# Patient Record
Sex: FEMALE | Race: WHITE | NOT HISPANIC OR LATINO | Employment: OTHER | URBAN - METROPOLITAN AREA
[De-identification: names, ages, dates, MRNs, and addresses within clinical notes are randomized per-mention and may not be internally consistent; named-entity substitution may affect disease eponyms.]

---

## 2018-04-10 ENCOUNTER — ANESTHESIA EVENT (OUTPATIENT)
Dept: PERIOP | Facility: HOSPITAL | Age: 73
End: 2018-04-10

## 2018-04-10 ENCOUNTER — OFFICE VISIT (OUTPATIENT)
Dept: SURGERY | Facility: CLINIC | Age: 73
End: 2018-04-10

## 2018-04-10 VITALS
DIASTOLIC BLOOD PRESSURE: 92 MMHG | BODY MASS INDEX: 33.82 KG/M2 | HEIGHT: 65 IN | SYSTOLIC BLOOD PRESSURE: 152 MMHG | WEIGHT: 203 LBS

## 2018-04-10 DIAGNOSIS — K80.20 CALCULUS OF GALLBLADDER WITHOUT CHOLECYSTITIS WITHOUT OBSTRUCTION: Primary | ICD-10-CM

## 2018-04-10 PROCEDURE — 99203 OFFICE O/P NEW LOW 30 MIN: CPT | Performed by: SURGERY

## 2018-04-10 RX ORDER — LEVOTHYROXINE SODIUM 112 UG/1
112 TABLET ORAL DAILY
COMMUNITY
Start: 2018-02-28 | End: 2021-01-16 | Stop reason: SDUPTHER

## 2018-04-10 NOTE — PROGRESS NOTES
PATIENT INFORMATION  Mer Ramos  GB CONS, MRI DONE AT [facility], ABD PAIN, BELCHING AND ACID REFLUX WITH CERTAIN FOODS, CONSTIPATION, has seen Dr. Mcfarland before     - 1945    CHIEF COMPLAINT  Chief Complaint   Patient presents with   • Cholelithiasis       HISTORY OF PRESENT ILLNESS  HPI she complains of intermittent epigastric right upper quadrant going around to her right back pain she also complains of some left upper quadrant pain as well.  She does have some nausea with S primarily.  She does complain of upper abdominal bloating.  She denies any fevers or chills or jaundice type symptoms.  She does take over-the-counter Zantac for reflux symptoms.  She had an MRI of her abdomen to follow-up on a left adrenal adenoma.  Gallstones were found at that time.        REVIEW OF SYSTEMS  Review of Systems back pain otherwise negative      ACTIVE PROBLEMS  Patient Active Problem List    Diagnosis   • Benign essential hypertension [I10]   • Chest pain [R07.9]     Overview Note:     Overview:   #1 normal nuclear stress test 2014 with no ischemia and LVEF 70%     • Hyperlipidemia [E78.5]   • Statin intolerance [Z78.9]         PAST MEDICAL HISTORY  Past Medical History:   Diagnosis Date   • Arthritis    • Hypertension    • Thyroid disease          SURGICAL HISTORY  Past Surgical History:   Procedure Laterality Date   • BREAST SURGERY     • COLONOSCOPY     • COLONOSCOPY N/A 2016    Procedure: COLONOSCOPY with polypectomy;  Surgeon: Angelia Catherine MD;  Location: [facility];  Service:    • HYSTERECTOMY     • THYROID SURGERY     • TUBAL ABDOMINAL LIGATION           FAMILY HISTORY  Family History   Problem Relation Age of Onset   • Breast cancer Maternal Grandmother          SOCIAL HISTORY  Social History     Occupational History   • Not on file.     Social History Main Topics   • Smoking status: Never Smoker   • Smokeless tobacco: Never Used   • Alcohol use No   • Drug use: No   • Sexual activity: Not on  "file         CURRENT MEDICATIONS    Current Outpatient Prescriptions:   •  ALPRAZolam (XANAX) 0.5 MG tablet, , Disp: , Rfl:   •  levothyroxine (SYNTHROID, LEVOTHROID) 112 MCG tablet, , Disp: , Rfl:   •  losartan (COZAAR) 50 MG tablet, , Disp: , Rfl:     ALLERGIES  Bactrim [sulfamethoxazole-trimethoprim]; Ceclor [cefaclor]; Ciprocinonide [fluocinolone]; Ciprofloxacin; Codeine; Levaquin [levofloxacin]; Penicillins; Sulfa antibiotics; and Tegretol [carbamazepine]    VITALS  Vitals:    04/10/18 0933   BP: 152/92   Weight: 92.1 kg (203 lb)   Height: 165.1 cm (65\")       LAST RESULTS   Admission on 03/02/2018, Discharged on 03/02/2018   Component Date Value Ref Range Status   • Color 03/02/2018 Yellow  Yellow, Straw, Dark Yellow, Chantale Final   • Clarity, UA 03/02/2018 Cloudy* Clear Final   • Glucose, UA 03/02/2018 Negative  Negative, 1000 mg/dL (3+) mg/dL Final   • Bilirubin 03/02/2018 Negative  Negative Final   • Ketones, UA 03/02/2018 Negative  Negative Final   • Specific Gravity  03/02/2018 1.005  1.005 - 1.030 Final   • Blood, UA 03/02/2018 50 Krystian/ul* Negative Final   • pH, Urine 03/02/2018 6.0  5.0 - 8.0 Final   • Protein, POC 03/02/2018 Negative  Negative mg/dL Final   • Urobilinogen, UA 03/02/2018 Normal  Normal Final   • Leukocytes 03/02/2018 Trace* Negative Final   • Nitrite, UA 03/02/2018 Negative  Negative Final   • Urine Culture 03/04/2018 Final report   Final   • Result 1 03/04/2018 Comment   Final     No results found.    PHYSICAL EXAM  Physical Exam's alert white female in no active distress.  She has no clinical jaundice.  She is oriented ×3.  Her trachea is in the midline.  Her gait is normal.  Her heart shows a regular rate and rhythm.  Her lungs are clear and equal.  Abdomen she has subjective epigastric and right upper quadrant tenderness.  There was no left upper quadrant tenderness.  She has a lower midline scar.  There was no abdominal mass.  MRI of her abdomen showed cholelithiasis and a 1.8 cm " left adrenal adenoma.    ASSESSMENT  Cholelithiasis      PLAN  There is benefits options were discussed with her in detail.  We will proceed with a laparoscopic cholecystectomy this is been arranged for [facility] for April 20 on an outpatient basis.

## 2018-04-10 NOTE — H&P
PATIENT INFORMATION  Mer Ramos  GB CONS, MRI DONE AT [facility], ABD PAIN, BELCHING AND ACID REFLUX WITH CERTAIN FOODS, CONSTIPATION, has seen Dr. Mcfarland before     - 1945    CHIEF COMPLAINT  Chief Complaint   Patient presents with   • Cholelithiasis       HISTORY OF PRESENT ILLNESS  HPI she complains of intermittent epigastric right upper quadrant going around to her right back pain she also complains of some left upper quadrant pain as well.  She does have some nausea with S primarily.  She does complain of upper abdominal bloating.  She denies any fevers or chills or jaundice type symptoms.  She does take over-the-counter Zantac for reflux symptoms.  She had an MRI of her abdomen to follow-up on a left adrenal adenoma.  Gallstones were found at that time.        REVIEW OF SYSTEMS  Review of Systems back pain otherwise negative      ACTIVE PROBLEMS  Patient Active Problem List    Diagnosis   • Benign essential hypertension [I10]   • Chest pain [R07.9]     Overview Note:     Overview:   #1 normal nuclear stress test 2014 with no ischemia and LVEF 70%     • Hyperlipidemia [E78.5]   • Statin intolerance [Z78.9]         PAST MEDICAL HISTORY  Past Medical History:   Diagnosis Date   • Arthritis    • Hypertension    • Thyroid disease          SURGICAL HISTORY  Past Surgical History:   Procedure Laterality Date   • BREAST SURGERY     • COLONOSCOPY     • COLONOSCOPY N/A 2016    Procedure: COLONOSCOPY with polypectomy;  Surgeon: Angelia Catherine MD;  Location: [facility];  Service:    • HYSTERECTOMY     • THYROID SURGERY     • TUBAL ABDOMINAL LIGATION           FAMILY HISTORY  Family History   Problem Relation Age of Onset   • Breast cancer Maternal Grandmother          SOCIAL HISTORY  Social History     Occupational History   • Not on file.     Social History Main Topics   • Smoking status: Never Smoker   • Smokeless tobacco: Never Used   • Alcohol use No   • Drug use: No   • Sexual activity: Not on  "file         CURRENT MEDICATIONS    Current Outpatient Prescriptions:   •  ALPRAZolam (XANAX) 0.5 MG tablet, , Disp: , Rfl:   •  levothyroxine (SYNTHROID, LEVOTHROID) 112 MCG tablet, , Disp: , Rfl:   •  losartan (COZAAR) 50 MG tablet, , Disp: , Rfl:     ALLERGIES  Bactrim [sulfamethoxazole-trimethoprim]; Ceclor [cefaclor]; Ciprocinonide [fluocinolone]; Ciprofloxacin; Codeine; Levaquin [levofloxacin]; Penicillins; Sulfa antibiotics; and Tegretol [carbamazepine]    VITALS  Vitals:    04/10/18 0933   BP: 152/92   Weight: 92.1 kg (203 lb)   Height: 165.1 cm (65\")       LAST RESULTS   Admission on 03/02/2018, Discharged on 03/02/2018   Component Date Value Ref Range Status   • Color 03/02/2018 Yellow  Yellow, Straw, Dark Yellow, Chantale Final   • Clarity, UA 03/02/2018 Cloudy* Clear Final   • Glucose, UA 03/02/2018 Negative  Negative, 1000 mg/dL (3+) mg/dL Final   • Bilirubin 03/02/2018 Negative  Negative Final   • Ketones, UA 03/02/2018 Negative  Negative Final   • Specific Gravity  03/02/2018 1.005  1.005 - 1.030 Final   • Blood, UA 03/02/2018 50 Krystian/ul* Negative Final   • pH, Urine 03/02/2018 6.0  5.0 - 8.0 Final   • Protein, POC 03/02/2018 Negative  Negative mg/dL Final   • Urobilinogen, UA 03/02/2018 Normal  Normal Final   • Leukocytes 03/02/2018 Trace* Negative Final   • Nitrite, UA 03/02/2018 Negative  Negative Final   • Urine Culture 03/04/2018 Final report   Final   • Result 1 03/04/2018 Comment   Final     No results found.    PHYSICAL EXAM  Physical Exam's alert white female in no active distress.  She has no clinical jaundice.  She is oriented ×3.  Her trachea is in the midline.  Her gait is normal.  Her heart shows a regular rate and rhythm.  Her lungs are clear and equal.  Abdomen she has subjective epigastric and right upper quadrant tenderness.  There was no left upper quadrant tenderness.  She has a lower midline scar.  There was no abdominal mass.  MRI of her abdomen showed cholelithiasis and a 1.8 cm " left adrenal adenoma.    ASSESSMENT  Cholelithiasis      PLAN  There is benefits options were discussed with her in detail.  We will proceed with a laparoscopic cholecystectomy this is been arranged for [facility] for April 20 on an outpatient basis.

## 2018-04-13 ENCOUNTER — APPOINTMENT (OUTPATIENT)
Dept: PREADMISSION TESTING | Facility: HOSPITAL | Age: 73
End: 2018-04-13

## 2018-04-13 VITALS
DIASTOLIC BLOOD PRESSURE: 86 MMHG | RESPIRATION RATE: 16 BRPM | BODY MASS INDEX: 33.57 KG/M2 | HEIGHT: 65 IN | WEIGHT: 201.5 LBS | OXYGEN SATURATION: 96 % | SYSTOLIC BLOOD PRESSURE: 142 MMHG | HEART RATE: 94 BPM

## 2018-04-13 DIAGNOSIS — K80.20 CALCULUS OF GALLBLADDER WITHOUT CHOLECYSTITIS WITHOUT OBSTRUCTION: ICD-10-CM

## 2018-04-13 LAB
ALBUMIN SERPL-MCNC: 4.1 G/DL (ref 3.5–5.2)
ALBUMIN/GLOB SERPL: 1.2 G/DL
ALP SERPL-CCNC: 37 U/L (ref 40–129)
ALT SERPL W P-5'-P-CCNC: 12 U/L (ref 5–33)
ANION GAP SERPL CALCULATED.3IONS-SCNC: 10.3 MMOL/L
AST SERPL-CCNC: 17 U/L (ref 5–32)
BILIRUB SERPL-MCNC: 0.3 MG/DL (ref 0.2–1.2)
BUN BLD-MCNC: 11 MG/DL (ref 8–23)
BUN/CREAT SERPL: 13.1 (ref 7–25)
CALCIUM SPEC-SCNC: 9.6 MG/DL (ref 8.8–10.5)
CHLORIDE SERPL-SCNC: 100 MMOL/L (ref 98–107)
CO2 SERPL-SCNC: 30.7 MMOL/L (ref 22–29)
CREAT BLD-MCNC: 0.84 MG/DL (ref 0.57–1)
DEPRECATED RDW RBC AUTO: 41.2 FL (ref 37–54)
ERYTHROCYTE [DISTWIDTH] IN BLOOD BY AUTOMATED COUNT: 13.2 % (ref 11.5–14.5)
GFR SERPL CREATININE-BSD FRML MDRD: 67 ML/MIN/1.73
GLOBULIN UR ELPH-MCNC: 3.5 GM/DL
GLUCOSE BLD-MCNC: 91 MG/DL (ref 65–99)
HCT VFR BLD AUTO: 36.5 % (ref 37–47)
HGB BLD-MCNC: 12 G/DL (ref 12–16)
MCH RBC QN AUTO: 28.4 PG (ref 27–31)
MCHC RBC AUTO-ENTMCNC: 32.9 G/DL (ref 31–37)
MCV RBC AUTO: 86.3 FL (ref 81–99)
PLATELET # BLD AUTO: 189 10*3/MM3 (ref 140–500)
PMV BLD AUTO: 11.1 FL (ref 7.4–10.4)
POTASSIUM BLD-SCNC: 4 MMOL/L (ref 3.5–5.2)
PROT SERPL-MCNC: 7.6 G/DL (ref 6–8.5)
RBC # BLD AUTO: 4.23 10*6/MM3 (ref 4.2–5.4)
SODIUM BLD-SCNC: 141 MMOL/L (ref 136–145)
WBC NRBC COR # BLD: 5.48 10*3/MM3 (ref 4.8–10.8)

## 2018-04-13 PROCEDURE — 36415 COLL VENOUS BLD VENIPUNCTURE: CPT

## 2018-04-13 PROCEDURE — 85027 COMPLETE CBC AUTOMATED: CPT | Performed by: SURGERY

## 2018-04-13 PROCEDURE — 80053 COMPREHEN METABOLIC PANEL: CPT | Performed by: SURGERY

## 2018-04-13 RX ORDER — FAMOTIDINE 10 MG
10 TABLET ORAL 2 TIMES DAILY PRN
COMMUNITY
End: 2019-04-02

## 2018-04-13 RX ORDER — ACETAMINOPHEN 500 MG
1000 TABLET ORAL EVERY 6 HOURS PRN
COMMUNITY

## 2018-04-13 NOTE — PAT
Pt here for PAT visit.  Pre-op tests completed, chg soap given, and instructions reviewed.  Instructed clears until 6:00 am dos, voiced understanding.

## 2018-04-13 NOTE — DISCHARGE INSTRUCTIONS
PRE-ADMISSION TESTING INSTRUCTIONS FOR ADULTS    Take these medications the morning of surgery with a small sip of water: Famotidine (pepcid) and Levothyroxine      No aspirin, advil, aleve, ibuprofen, naproxen, diet pills, decongestants, or herbal/vitamins for a week prior to surgery.    General Instructions:    • Do not eat solid food after midnight the night before surgery.  No gum, mints, or hard candy after midnight the night before surgery.  • You may drink clear liquids the day of surgery up until 2 hours before your arrival time.  (Until 6:00 am)  • Clear liquids are liquids you can see through. Nothing RED in color.    Plain water    Sports drinks  Sodas     Gelatin (Jell-O)  Fruit juices without pulp such as white grape juice and apple juice  Popsicles that contain no fruit or yogurt  Tea or coffee (no cream or milk added)    • It is beneficial for you to have a clear drink that contains carbohydrates just before you leave your house and before your fasting time begins.  We suggest a 20 ounce bottle of Gatorade or Powerade for non-diabetic patients or a 20 ounce bottle of G2 or Powerade Zero for diabetic patients.     • Patients who avoid smoking, chewing tobacco and alcohol for 4 weeks prior to surgery have a reduced risk of post-operative complications.  If at all possible, quit smoking as many days before surgery as you can.    • Do not smoke, use chewing tobacco or drink alcohol the day of surgery    • Bring your C-PAP/ BI-PAP machine if you use one.  • Wear clean comfortable clothes and socks.  • Do not wear contact lenses, lotion, deodorant, or make-up.  Bring a case for your glasses if applicable. You may brush your teeth the morning of surgery.  • You may wear dentures/partials, do not put adhesive/glue on them.  • Bring crutches or walker if applicable.  • Leave all other jewelry and valuables at home.      Preventing a Surgical Site Infection:    • Shower the night before and on the morning of  surgery using the chlorhexidine soap you were given.  Use a clean washcloth with the soap.  Place clean sheets on your bed after showering the night before surgery. Do not use the CHG soap on your hair, face, or private areas. Wash your body gently for five (5) minutes. Do not scrub your skin.  Dry with a clean towel and dress in clean clothing.    • Do not shave the surgical area for 10 days-2 weeks prior to surgery  because the razor can irritate skin and make it easier to develop an infection.  • Make sure you, your family, and all healthcare providers clean their hands with soap and water or an alcohol based hand  before caring for you or your wound.      Day of surgery:    Your surgeon’s office will advise you of your arrival time for the day of surgery.    Upon arrival, a Pre-op nurse and Anesthesia provider will review your health history, obtain vital signs, and answer questions you may have.  The only belongings needed at this time will be your home medications and if applicable your C-PAP/BI-PAP machine.  If you are staying overnight your family can leave the rest of your belongings in the car and bring them to your room later.  A Pre-op nurse will start an IV and you may receive medication in preparation for surgery, including something to help you relax.  Your family will be able to see you in the Pre-op area.  While you are in surgery your family should notify the waiting room  if they leave the waiting room area and provide a contact phone number.    IF you have any questions, you can call the Pre-Admission Department at [phone] or your surgeon's office.  Notify your surgeon if  you become sick, have a fever, productive cough, or cannot be here the day of surgery    Please be aware that surgery does come with discomfort.  We want to make every effort to control your discomfort so please discuss any uncontrolled symptoms with your nurse.   Your doctor will most likely have  prescribed pain medications.      If you are going home after surgery, you will receive individualized written care instructions before being discharged.  A responsible adult (over the age of 18) must drive you to and from the hospital on the day of your surgery and stay with you for 24 hours after anesthesia.    If you are staying overnight following surgery, you will be transported to your hospital room following the recovery period.  [facility] has all private rooms.    Deductibles and co-payments are collected on the day of service. Please be prepared to pay the required co-pay, deductible or deposit on the day of service as defined by your plan.    Laparoscopic Cholecystectomy    Laparoscopic cholecystectomy is surgery to remove the gallbladder. The gallbladder is a pear-shaped organ that lies beneath the liver on the right side of the body. The gallbladder stores bile, which is a fluid that helps the body to digest fats. Cholecystectomy is often done for inflammation of the gallbladder (cholecystitis). This condition is usually caused by a buildup of gallstones (cholelithiasis) in the gallbladder. Gallstones can block the flow of bile, which can result in inflammation and pain. In severe cases, emergency surgery may be required.  This procedure is done though small incisions in your abdomen (laparoscopic surgery). A thin scope with a camera (laparoscope) is inserted through one incision. Thin surgical instruments are inserted through the other incisions. In some cases, a laparoscopic procedure may be turned into a type of surgery that is done through a larger incision (open surgery).  Tell a health care provider about:  · Any allergies you have.  · All medicines you are taking, including vitamins, herbs, eye drops, creams, and over-the-counter medicines.  · Any problems you or family members have had with anesthetic medicines.  · Any blood disorders you have.  · Any surgeries you have had.  · Any  medical conditions you have.  · Whether you are pregnant or may be pregnant.  What are the risks?  Generally, this is a safe procedure. However, problems may occur, including:  · Infection.  · Bleeding.  · Allergic reactions to medicines.  · Damage to other structures or organs.  · A stone remaining in the common bile duct. The common bile duct carries bile from the gallbladder into the small intestine.  · A bile leak from the cyst duct that is clipped when your gallbladder is removed.  What happens before the procedure?  Staying hydrated   Follow instructions from your health care provider about hydration, which may include:  · Up to 2 hours before your arrival time - you may continue to drink clear liquids, such as water, clear fruit juice, black coffee, and plain tea.  Eating and drinking restrictions   Nothing to eat after midnight, including gum, mints, or hard candy; unless your health care provider tells you a different time.  Medicines  · Ask your health care provider about:  ¨ Changing or stopping your regular medicines. This is especially important if you are taking diabetes medicines or blood thinners.  ¨ Taking medicines such as aspirin and ibuprofen. These medicines can thin your blood. Do not take these medicines before your procedure if your health care provider instructs you not to.  · You may be given antibiotic medicine to help prevent infection.  General instructions  · Let your health care provider know if you develop a cold or an infection before surgery.  · Plan to have someone take you home from the hospital or clinic.  · Ask your health care provider how your surgical site will be marked or identified.  What happens during the procedure?  ·     · To reduce your risk of infection:  ¨ Your health care team will wash or sanitize their hands.  ¨ Your skin will be washed with soap.  ¨ Hair may be removed from the surgical area.  · An IV tube may be inserted into one of your veins.  · You will be  given one or more of the following:  ¨ A medicine to help you relax (sedative).  ¨ A medicine to make you fall asleep (general anesthetic).  · A breathing tube will be placed in your mouth.  · Your surgeon will make several small cuts (incisions) in your abdomen.  · The laparoscope will be inserted through one of the small incisions. The camera on the laparoscope will send images to a TV screen (monitor) in the operating room. This lets your surgeon see inside your abdomen.  · Air-like gas will be pumped into your abdomen. This will expand your abdomen to give the surgeon more room to perform the surgery.  · Other tools that are needed for the procedure will be inserted through the other incisions. The gallbladder will be removed through one of the incisions.  · Your common bile duct may be examined. If stones are found in the common bile duct, they may be removed.  · After your gallbladder has been removed, the incisions will be closed with stitches (sutures), staples, or skin glue.  · Your incisions may be covered with a bandage (dressing).  The procedure may vary among health care providers and hospitals.  What happens after the procedure?  · Your blood pressure, heart rate, breathing rate, and blood oxygen level will be monitored until the medicines you were given have worn off.  · You will be given medicines as needed to control your pain.  · Do not drive for 24 hours if you were given a sedative.  This information is not intended to replace advice given to you by your health care provider. Make sure you discuss any questions you have with your health care provider.  Document Released: 12/18/2006 Document Revised: 07/09/2017 Document Reviewed: 06/05/2017  Elsevier Interactive Patient Education © 2017 [employer]

## 2018-04-20 ENCOUNTER — ANESTHESIA (OUTPATIENT)
Dept: PERIOP | Facility: HOSPITAL | Age: 73
End: 2018-04-20

## 2018-04-20 ENCOUNTER — HOSPITAL ENCOUNTER (OUTPATIENT)
Facility: HOSPITAL | Age: 73
Setting detail: HOSPITAL OUTPATIENT SURGERY
Discharge: HOME | End: 2018-04-20
Attending: SURGERY | Admitting: SURGERY

## 2018-04-20 VITALS
SYSTOLIC BLOOD PRESSURE: 126 MMHG | DIASTOLIC BLOOD PRESSURE: 80 MMHG | HEART RATE: 76 BPM | TEMPERATURE: 97.7 F | OXYGEN SATURATION: 94 % | HEIGHT: 65 IN | WEIGHT: 202.2 LBS | RESPIRATION RATE: 14 BRPM | BODY MASS INDEX: 33.69 KG/M2

## 2018-04-20 DIAGNOSIS — K80.20 CALCULUS OF GALLBLADDER WITHOUT CHOLECYSTITIS WITHOUT OBSTRUCTION: ICD-10-CM

## 2018-04-20 PROCEDURE — 25010000002 ONDANSETRON PER 1 MG: Performed by: NURSE ANESTHETIST, CERTIFIED REGISTERED

## 2018-04-20 PROCEDURE — 25010000002 DEXAMETHASONE PER 1 MG: Performed by: NURSE ANESTHETIST, CERTIFIED REGISTERED

## 2018-04-20 PROCEDURE — 25010000002 FENTANYL CITRATE (PF) 100 MCG/2ML SOLUTION: Performed by: NURSE ANESTHETIST, CERTIFIED REGISTERED

## 2018-04-20 PROCEDURE — 47562 LAPAROSCOPIC CHOLECYSTECTOMY: CPT | Performed by: SURGERY

## 2018-04-20 PROCEDURE — 25010000002 MIDAZOLAM PER 1 MG: Performed by: NURSE ANESTHETIST, CERTIFIED REGISTERED

## 2018-04-20 PROCEDURE — 25010000002 PROPOFOL 10 MG/ML EMULSION: Performed by: NURSE ANESTHETIST, CERTIFIED REGISTERED

## 2018-04-20 PROCEDURE — 25010000002 HYDROMORPHONE PER 4 MG: Performed by: NURSE ANESTHETIST, CERTIFIED REGISTERED

## 2018-04-20 PROCEDURE — 25010000002 SUCCINYLCHOLINE PER 20 MG: Performed by: NURSE ANESTHETIST, CERTIFIED REGISTERED

## 2018-04-20 RX ORDER — BUPIVACAINE HYDROCHLORIDE AND EPINEPHRINE 2.5; 5 MG/ML; UG/ML
INJECTION, SOLUTION INFILTRATION; PERINEURAL AS NEEDED
Status: DISCONTINUED | OUTPATIENT
Start: 2018-04-20 | End: 2018-04-20 | Stop reason: HOSPADM

## 2018-04-20 RX ORDER — MIDAZOLAM HYDROCHLORIDE 1 MG/ML
2 INJECTION INTRAMUSCULAR; INTRAVENOUS
Status: DISCONTINUED | OUTPATIENT
Start: 2018-04-20 | End: 2018-04-20 | Stop reason: HOSPADM

## 2018-04-20 RX ORDER — LIDOCAINE HYDROCHLORIDE 10 MG/ML
0.5 INJECTION, SOLUTION EPIDURAL; INFILTRATION; INTRACAUDAL; PERINEURAL ONCE AS NEEDED
Status: COMPLETED | OUTPATIENT
Start: 2018-04-20 | End: 2018-04-20

## 2018-04-20 RX ORDER — SODIUM CHLORIDE, SODIUM LACTATE, POTASSIUM CHLORIDE, CALCIUM CHLORIDE 600; 310; 30; 20 MG/100ML; MG/100ML; MG/100ML; MG/100ML
9 INJECTION, SOLUTION INTRAVENOUS CONTINUOUS
Status: DISCONTINUED | OUTPATIENT
Start: 2018-04-20 | End: 2018-04-20

## 2018-04-20 RX ORDER — ONDANSETRON 2 MG/ML
4 INJECTION INTRAMUSCULAR; INTRAVENOUS ONCE AS NEEDED
Status: COMPLETED | OUTPATIENT
Start: 2018-04-20 | End: 2018-04-20

## 2018-04-20 RX ORDER — EPHEDRINE SULFATE 50 MG/ML
INJECTION, SOLUTION INTRAVENOUS AS NEEDED
Status: DISCONTINUED | OUTPATIENT
Start: 2018-04-20 | End: 2018-04-20 | Stop reason: SURG

## 2018-04-20 RX ORDER — ONDANSETRON 4 MG/1
4 TABLET, FILM COATED ORAL EVERY 4 HOURS PRN
Qty: 20 TABLET | Refills: 1 | Status: SHIPPED | OUTPATIENT
Start: 2018-04-20 | End: 2018-06-30

## 2018-04-20 RX ORDER — LIDOCAINE HYDROCHLORIDE 20 MG/ML
INJECTION, SOLUTION INFILTRATION; PERINEURAL AS NEEDED
Status: DISCONTINUED | OUTPATIENT
Start: 2018-04-20 | End: 2018-04-20 | Stop reason: SURG

## 2018-04-20 RX ORDER — SODIUM CHLORIDE 0.9 % (FLUSH) 0.9 %
1-10 SYRINGE (ML) INJECTION AS NEEDED
Status: DISCONTINUED | OUTPATIENT
Start: 2018-04-20 | End: 2018-04-20 | Stop reason: HOSPADM

## 2018-04-20 RX ORDER — TRAMADOL HYDROCHLORIDE 50 MG/1
50 TABLET ORAL EVERY 6 HOURS PRN
Qty: 30 TABLET | Refills: 0 | Status: SHIPPED | OUTPATIENT
Start: 2018-04-20 | End: 2018-06-30

## 2018-04-20 RX ORDER — DEXAMETHASONE SODIUM PHOSPHATE 4 MG/ML
4 INJECTION, SOLUTION INTRA-ARTICULAR; INTRALESIONAL; INTRAMUSCULAR; INTRAVENOUS; SOFT TISSUE ONCE
Status: COMPLETED | OUTPATIENT
Start: 2018-04-20 | End: 2018-04-20

## 2018-04-20 RX ORDER — MAGNESIUM HYDROXIDE 1200 MG/15ML
LIQUID ORAL AS NEEDED
Status: DISCONTINUED | OUTPATIENT
Start: 2018-04-20 | End: 2018-04-20 | Stop reason: HOSPADM

## 2018-04-20 RX ORDER — GLYCOPYRROLATE 0.2 MG/ML
INJECTION INTRAMUSCULAR; INTRAVENOUS AS NEEDED
Status: DISCONTINUED | OUTPATIENT
Start: 2018-04-20 | End: 2018-04-20 | Stop reason: SURG

## 2018-04-20 RX ORDER — ONDANSETRON 2 MG/ML
4 INJECTION INTRAMUSCULAR; INTRAVENOUS ONCE
Status: COMPLETED | OUTPATIENT
Start: 2018-04-20 | End: 2018-04-20

## 2018-04-20 RX ORDER — SODIUM CHLORIDE, SODIUM LACTATE, POTASSIUM CHLORIDE, CALCIUM CHLORIDE 600; 310; 30; 20 MG/100ML; MG/100ML; MG/100ML; MG/100ML
100 INJECTION, SOLUTION INTRAVENOUS CONTINUOUS
Status: DISCONTINUED | OUTPATIENT
Start: 2018-04-20 | End: 2018-04-20 | Stop reason: HOSPADM

## 2018-04-20 RX ORDER — ROCURONIUM BROMIDE 10 MG/ML
INJECTION, SOLUTION INTRAVENOUS AS NEEDED
Status: DISCONTINUED | OUTPATIENT
Start: 2018-04-20 | End: 2018-04-20 | Stop reason: SURG

## 2018-04-20 RX ORDER — MIDAZOLAM HYDROCHLORIDE 1 MG/ML
1 INJECTION INTRAMUSCULAR; INTRAVENOUS AS NEEDED
Status: DISCONTINUED | OUTPATIENT
Start: 2018-04-20 | End: 2018-04-20 | Stop reason: HOSPADM

## 2018-04-20 RX ORDER — SODIUM CHLORIDE 9 MG/ML
40 INJECTION, SOLUTION INTRAVENOUS AS NEEDED
Status: DISCONTINUED | OUTPATIENT
Start: 2018-04-20 | End: 2018-04-20 | Stop reason: HOSPADM

## 2018-04-20 RX ORDER — CLINDAMYCIN PHOSPHATE 900 MG/50ML
900 INJECTION INTRAVENOUS ONCE
Status: COMPLETED | OUTPATIENT
Start: 2018-04-20 | End: 2018-04-20

## 2018-04-20 RX ORDER — TRAMADOL HYDROCHLORIDE 50 MG/1
50 TABLET ORAL EVERY 6 HOURS PRN
Status: DISCONTINUED | OUTPATIENT
Start: 2018-04-20 | End: 2018-04-20 | Stop reason: HOSPADM

## 2018-04-20 RX ORDER — FENTANYL CITRATE 50 UG/ML
INJECTION, SOLUTION INTRAMUSCULAR; INTRAVENOUS AS NEEDED
Status: DISCONTINUED | OUTPATIENT
Start: 2018-04-20 | End: 2018-04-20 | Stop reason: SURG

## 2018-04-20 RX ORDER — SUCCINYLCHOLINE CHLORIDE 20 MG/ML
INJECTION INTRAMUSCULAR; INTRAVENOUS AS NEEDED
Status: DISCONTINUED | OUTPATIENT
Start: 2018-04-20 | End: 2018-04-20 | Stop reason: SURG

## 2018-04-20 RX ORDER — PROPOFOL 10 MG/ML
VIAL (ML) INTRAVENOUS AS NEEDED
Status: DISCONTINUED | OUTPATIENT
Start: 2018-04-20 | End: 2018-04-20 | Stop reason: SURG

## 2018-04-20 RX ADMIN — ONDANSETRON HYDROCHLORIDE 4 MG: 2 INJECTION, SOLUTION INTRAMUSCULAR; INTRAVENOUS at 12:19

## 2018-04-20 RX ADMIN — LIDOCAINE HYDROCHLORIDE 0.5 ML: 10 INJECTION, SOLUTION EPIDURAL; INFILTRATION; INTRACAUDAL; PERINEURAL at 10:05

## 2018-04-20 RX ADMIN — EPHEDRINE SULFATE 10 MG: 50 INJECTION INTRAMUSCULAR; INTRAVENOUS; SUBCUTANEOUS at 10:52

## 2018-04-20 RX ADMIN — ROCURONIUM BROMIDE 5 MG: 10 INJECTION INTRAVENOUS at 10:29

## 2018-04-20 RX ADMIN — MIDAZOLAM HYDROCHLORIDE 1 MG: 1 INJECTION, SOLUTION INTRAMUSCULAR; INTRAVENOUS at 10:04

## 2018-04-20 RX ADMIN — CLINDAMYCIN IN 5 PERCENT DEXTROSE 900 MG: 18 INJECTION, SOLUTION INTRAVENOUS at 10:33

## 2018-04-20 RX ADMIN — SUGAMMADEX 2 ML: 100 INJECTION, SOLUTION INTRAVENOUS at 11:17

## 2018-04-20 RX ADMIN — PROPOFOL 150 MG: 10 INJECTION, EMULSION INTRAVENOUS at 10:29

## 2018-04-20 RX ADMIN — SUCCINYLCHOLINE CHLORIDE 120 MG: 20 INJECTION, SOLUTION INTRAMUSCULAR; INTRAVENOUS at 10:29

## 2018-04-20 RX ADMIN — DEXAMETHASONE SODIUM PHOSPHATE 4 MG: 4 INJECTION, SOLUTION INTRAMUSCULAR; INTRAVENOUS at 08:50

## 2018-04-20 RX ADMIN — SODIUM CHLORIDE, POTASSIUM CHLORIDE, SODIUM LACTATE AND CALCIUM CHLORIDE 9 ML/HR: 600; 310; 30; 20 INJECTION, SOLUTION INTRAVENOUS at 08:50

## 2018-04-20 RX ADMIN — ONDANSETRON HYDROCHLORIDE 4 MG: 2 INJECTION, SOLUTION INTRAMUSCULAR; INTRAVENOUS at 08:50

## 2018-04-20 RX ADMIN — TRAMADOL HYDROCHLORIDE 50 MG: 50 TABLET, FILM COATED ORAL at 13:15

## 2018-04-20 RX ADMIN — HYDROMORPHONE HYDROCHLORIDE 0.25 MG: 1 INJECTION, SOLUTION INTRAMUSCULAR; INTRAVENOUS; SUBCUTANEOUS at 12:20

## 2018-04-20 RX ADMIN — FENTANYL CITRATE 25 MCG: 50 INJECTION, SOLUTION INTRAMUSCULAR; INTRAVENOUS at 11:02

## 2018-04-20 RX ADMIN — GLYCOPYRROLATE 0.2 MG: 0.2 INJECTION INTRAMUSCULAR; INTRAVENOUS at 10:51

## 2018-04-20 RX ADMIN — FENTANYL CITRATE 50 MCG: 50 INJECTION, SOLUTION INTRAMUSCULAR; INTRAVENOUS at 10:29

## 2018-04-20 RX ADMIN — HYDROMORPHONE HYDROCHLORIDE 0.25 MG: 1 INJECTION, SOLUTION INTRAMUSCULAR; INTRAVENOUS; SUBCUTANEOUS at 12:31

## 2018-04-20 RX ADMIN — SODIUM CHLORIDE, POTASSIUM CHLORIDE, SODIUM LACTATE AND CALCIUM CHLORIDE: 600; 310; 30; 20 INJECTION, SOLUTION INTRAVENOUS at 10:21

## 2018-04-20 RX ADMIN — LIDOCAINE HYDROCHLORIDE 100 MG: 20 INJECTION, SOLUTION INFILTRATION; PERINEURAL at 10:29

## 2018-04-20 NOTE — ANESTHESIA POSTPROCEDURE EVALUATION
Patient: Mer Ramos    Procedure Summary     Date:  04/20/18 Room / Location:   [facility] 2 /  [facility]    Anesthesia Start:  1020 Anesthesia Stop:  1148    Procedure:  CHOLECYSTECTOMY LAPAROSCOPIC (N/A Abdomen) Diagnosis:       Calculus of gallbladder without cholecystitis without obstruction      (Calculus of gallbladder without cholecystitis without obstruction [K80.20])    Surgeon:  Poli Mcfarland MD Provider:  Zenaida Starkey CRNA    Anesthesia Type:  general ASA Status:  2          Anesthesia Type: general  Last vitals  BP   132/84 (04/20/18 1232)   Temp   97.8 °F (36.6 °C) (04/20/18 0812)   Pulse   85 (04/20/18 1232)   Resp   16 (04/20/18 0812)     SpO2   91 % (04/20/18 1232)     Post Anesthesia Care and Evaluation    Patient location during evaluation: bedside  Patient participation: complete - patient participated  Level of consciousness: awake and alert  Pain score: 0  Pain management: adequate  Airway patency: patent  Anesthetic complications: No anesthetic complications    Cardiovascular status: acceptable  Respiratory status: acceptable  Hydration status: acceptable

## 2018-04-20 NOTE — ANESTHESIA PREPROCEDURE EVALUATION
" Anesthesia Evaluation     Patient summary reviewed and Nursing notes reviewed   NPO Solid Status: > 8 hours  NPO Liquid Status: > 2 hours           Airway   Mallampati: II  TM distance: >3 FB  Neck ROM: full  No difficulty expected  Dental    (+) edentulous    Pulmonary - negative pulmonary ROS and normal exam    breath sounds clear to auscultation    ROS comment: DRY COUGH /\"ALLERGIES\"  Cardiovascular - normal exam    ECG reviewed    (+) hypertension, hyperlipidemia,     ROS comment: PRINTED EKG NOTED ON CHART    Neuro/Psych  (+) dizziness/light headedness (VERTIGO), psychiatric history Anxiety,     GI/Hepatic/Renal/Endo    (+)  GERD,    (-) hypothyroidism (S/P THYROIDECTOMY)    Musculoskeletal     (+) back pain (LUMBAR ),   Abdominal    Substance History - negative use     OB/GYN          Other        History of cancer: SKIN CANCERS.  ROS/Med Hx Other: H/O HYPOKALEMIA WHEN ON HCTZ/ CAUSING ARRYTHMIAS. /NO PROB SINCE MED D/C'D    HAS TAKEN TRAMADOL POST OP FOR PAIN/ STATES MORPHINE OR DILAUDID OK TO TAKE                  Anesthesia Plan    ASA 2     general     intravenous induction   Anesthetic plan and risks discussed with patient.  Use of blood products discussed with patient  Consented to blood products.     "

## 2018-04-20 NOTE — H&P (VIEW-ONLY)
PATIENT INFORMATION  Mer Ramos  GB CONS, MRI DONE AT [facility], ABD PAIN, BELCHING AND ACID REFLUX WITH CERTAIN FOODS, CONSTIPATION, has seen Dr. Mcfarland before     - 1945    CHIEF COMPLAINT  Chief Complaint   Patient presents with   • Cholelithiasis       HISTORY OF PRESENT ILLNESS  HPI she complains of intermittent epigastric right upper quadrant going around to her right back pain she also complains of some left upper quadrant pain as well.  She does have some nausea with S primarily.  She does complain of upper abdominal bloating.  She denies any fevers or chills or jaundice type symptoms.  She does take over-the-counter Zantac for reflux symptoms.  She had an MRI of her abdomen to follow-up on a left adrenal adenoma.  Gallstones were found at that time.        REVIEW OF SYSTEMS  Review of Systems back pain otherwise negative      ACTIVE PROBLEMS  Patient Active Problem List    Diagnosis   • Benign essential hypertension [I10]   • Chest pain [R07.9]     Overview Note:     Overview:   #1 normal nuclear stress test 2014 with no ischemia and LVEF 70%     • Hyperlipidemia [E78.5]   • Statin intolerance [Z78.9]         PAST MEDICAL HISTORY  Past Medical History:   Diagnosis Date   • Arthritis    • Hypertension    • Thyroid disease          SURGICAL HISTORY  Past Surgical History:   Procedure Laterality Date   • BREAST SURGERY     • COLONOSCOPY     • COLONOSCOPY N/A 2016    Procedure: COLONOSCOPY with polypectomy;  Surgeon: Angelia Catherine MD;  Location: [facility];  Service:    • HYSTERECTOMY     • THYROID SURGERY     • TUBAL ABDOMINAL LIGATION           FAMILY HISTORY  Family History   Problem Relation Age of Onset   • Breast cancer Maternal Grandmother          SOCIAL HISTORY  Social History     Occupational History   • Not on file.     Social History Main Topics   • Smoking status: Never Smoker   • Smokeless tobacco: Never Used   • Alcohol use No   • Drug use: No   • Sexual activity: Not on  "file         CURRENT MEDICATIONS    Current Outpatient Prescriptions:   •  ALPRAZolam (XANAX) 0.5 MG tablet, , Disp: , Rfl:   •  levothyroxine (SYNTHROID, LEVOTHROID) 112 MCG tablet, , Disp: , Rfl:   •  losartan (COZAAR) 50 MG tablet, , Disp: , Rfl:     ALLERGIES  Bactrim [sulfamethoxazole-trimethoprim]; Ceclor [cefaclor]; Ciprocinonide [fluocinolone]; Ciprofloxacin; Codeine; Levaquin [levofloxacin]; Penicillins; Sulfa antibiotics; and Tegretol [carbamazepine]    VITALS  Vitals:    04/10/18 0933   BP: 152/92   Weight: 92.1 kg (203 lb)   Height: 165.1 cm (65\")       LAST RESULTS   Admission on 03/02/2018, Discharged on 03/02/2018   Component Date Value Ref Range Status   • Color 03/02/2018 Yellow  Yellow, Straw, Dark Yellow, Chantale Final   • Clarity, UA 03/02/2018 Cloudy* Clear Final   • Glucose, UA 03/02/2018 Negative  Negative, 1000 mg/dL (3+) mg/dL Final   • Bilirubin 03/02/2018 Negative  Negative Final   • Ketones, UA 03/02/2018 Negative  Negative Final   • Specific Gravity  03/02/2018 1.005  1.005 - 1.030 Final   • Blood, UA 03/02/2018 50 Krystian/ul* Negative Final   • pH, Urine 03/02/2018 6.0  5.0 - 8.0 Final   • Protein, POC 03/02/2018 Negative  Negative mg/dL Final   • Urobilinogen, UA 03/02/2018 Normal  Normal Final   • Leukocytes 03/02/2018 Trace* Negative Final   • Nitrite, UA 03/02/2018 Negative  Negative Final   • Urine Culture 03/04/2018 Final report   Final   • Result 1 03/04/2018 Comment   Final     No results found.    PHYSICAL EXAM  Physical Exam's alert white female in no active distress.  She has no clinical jaundice.  She is oriented ×3.  Her trachea is in the midline.  Her gait is normal.  Her heart shows a regular rate and rhythm.  Her lungs are clear and equal.  Abdomen she has subjective epigastric and right upper quadrant tenderness.  There was no left upper quadrant tenderness.  She has a lower midline scar.  There was no abdominal mass.  MRI of her abdomen showed cholelithiasis and a 1.8 cm " left adrenal adenoma.    ASSESSMENT  Cholelithiasis      PLAN  There is benefits options were discussed with her in detail.  We will proceed with a laparoscopic cholecystectomy this is been arranged for [facility] for April 20 on an outpatient basis.

## 2018-04-20 NOTE — ANESTHESIA PROCEDURE NOTES
Airway  Airway not difficult    General Information and Staff    Patient location during procedure: OR  Anesthesiologist: HAILE ASCENCIO    Indications and Patient Condition  Indications for airway management: airway protection    Preoxygenated: yes  MILS maintained throughout  Mask difficulty assessment: 1 - vent by mask    Final Airway Details  Final airway type: endotracheal airway      Successful airway: ETT  Cuffed: yes   Successful intubation technique: direct laryngoscopy  Endotracheal tube insertion site: oral  Blade: Silviano  Blade size: #3  ETT size: 7.0 mm  Cormack-Lehane Classification: grade I - full view of glottis  Placement verified by: chest auscultation and capnometry   Number of attempts at approach: 1

## 2018-04-20 NOTE — OP NOTE
Preoperative diagnosis cholelithiasis  Postoperative diagnosis is same  Procedure laparoscopic cholecystectomy  Complications none  Drains none  Specimen gallbladder to pathology  Estimated blood loss 20 cc  Anesthesia Gen. via endotracheal tube  Surgeon Dr. Mcfarland  Asst. Dr. Howell  Findings cholelithiasis, omental adhesions to the anterior abdominal wall  Procedure per satisfactory induction of general anesthesia via endotracheal tube the patient's abdomen was prepped and draped sterile fashion.  Transverse infra umbilical skin incision is made carried out through skin and subcutaneous tissue.  Fascia and peritoneum were divided.  Finger was placed within the abdomen she had some omental adhesions up to the anterior abdominal wall.  Ki trocar was placed within the abdomen and the abdomen was insufflated 14 mmHg with use CO2.  General survey of the abdomen showed the omental adhesions otherwise appeared to be normal.  The omental adhesions were inferior to the Ki trocar.  5 mm ports ×3 were placed one in the epigastrium 2 in the right upper quadrant under direct vision after each site had been locally infiltrated with quarter percent Marcaine with epinephrine.   gallbladder was grasped and neck the gallbladder was dissected out at its junction with the cystic duct.  Cystic duct was dissected out,cystic duct lymph node was overlying the artery and was dissected out.  There was a mild ooze from the cystic duct lymph node.  Cystic artery was dissected out.  Cystic duct was clipped ×3 and divided leaving 2 clips on the cystic duct stump.  Cystic artery was clipped ×3 and divided leaving 2 clips on the cystic artery stump.  Gallbladder was sequentially taken out of the liver bed with use of  cautery.  There was a small appendage of liver that was adherent to the gallbladder and this was removed with the specimen.  Hemostasis was assured in the liver bed.  Gallbladder was removed through the infraumbilical port was  inspected found the divided the neck the gallbladder cystic duct junction.  Right upper quadrant was liberally irrigated hemostasis appeared to be good but it was elected to place a small piece of Surgicel on the cystic duct lymph node.  5 mm ports sequentially pulled back all were hemostatic.  Abdomen was desufflated.  Ki trocar was removed.  Infra umbilical fascia was closed in interrupted simple fashion with use of 0 Ethibond placing all sutures and tying at completion.  Skin was closed with 4-0 Monocryl running subcuticular stitch burying the knots.  Wounds were clean and dry and sterilely dressed.  The patient tolerated the procedure well was transferred to the recovery room in stable condition.  after she is awake alert stable tolerating by mouth she'll be discharged home to follow-up in my office next week call for problems.  Diet as tolerated.  No lifting more than 5 pounds.  She was given a prescription for Zofran 4 mg by mouth every 4 hours when necessary pain 20 of these were dispensed without refills and tramadol 50 mg by mouth every 6 hours when necessary pain and 30 these were dispensed without refills.  She should call for problems and call for an appointment.  May shower in the a.m. no lifting more than 5 pounds.

## 2018-04-25 ENCOUNTER — OFFICE VISIT (OUTPATIENT)
Dept: SURGERY | Facility: CLINIC | Age: 73
End: 2018-04-25

## 2018-04-25 DIAGNOSIS — Z09 SURGICAL FOLLOW-UP CARE: Primary | ICD-10-CM

## 2018-04-25 PROCEDURE — 99024 POSTOP FOLLOW-UP VISIT: CPT | Performed by: SURGERY

## 2018-04-25 NOTE — PROGRESS NOTES
5 DAYS S/P LAP JANELLE, NO OCMPLaiNTS  sHe feels well.  She is eating well she is now moving her bowels.  Her incisions are healing well there is mild erythema secondary to the Monocryl suture.  There is no impulse.  Her pathology report was discussed activity level was discussed and I will see her back in 1 month.

## 2018-04-30 LAB
LAB AP CASE REPORT: NORMAL
Lab: NORMAL
PATH REPORT.FINAL DX SPEC: NORMAL

## 2018-06-18 ENCOUNTER — OFFICE VISIT (OUTPATIENT)
Dept: SURGERY | Facility: CLINIC | Age: 73
End: 2018-06-18

## 2018-06-18 DIAGNOSIS — Z09 SURGICAL FOLLOW-UP CARE: Primary | ICD-10-CM

## 2018-06-18 PROCEDURE — 99024 POSTOP FOLLOW-UP VISIT: CPT | Performed by: SURGERY

## 2018-06-18 NOTE — PROGRESS NOTES
8 wks 3 days s/p lap maki, no complaints  He is without complaints.  She is eating well.  She has lost some weight.  Her incisions are healing well.  She has no clinical jaundice.  There is no impulse.  She can resume normal activities and I will see her back when necessary.

## 2018-10-25 ENCOUNTER — TRANSCRIBE ORDERS (OUTPATIENT)
Dept: ADMINISTRATIVE | Facility: HOSPITAL | Age: 73
End: 2018-10-25

## 2018-10-25 DIAGNOSIS — R42 DIZZINESS AND GIDDINESS: Primary | ICD-10-CM

## 2018-11-07 ENCOUNTER — APPOINTMENT (OUTPATIENT)
Dept: MRI IMAGING | Facility: HOSPITAL | Age: 73
End: 2018-11-07

## 2019-02-12 ENCOUNTER — OFFICE VISIT (OUTPATIENT)
Dept: GASTROENTEROLOGY | Facility: CLINIC | Age: 74
End: 2019-02-12

## 2019-02-12 VITALS
WEIGHT: 202.6 LBS | HEIGHT: 65 IN | DIASTOLIC BLOOD PRESSURE: 78 MMHG | BODY MASS INDEX: 33.76 KG/M2 | SYSTOLIC BLOOD PRESSURE: 132 MMHG

## 2019-02-12 DIAGNOSIS — K59.00 CONSTIPATION, UNSPECIFIED CONSTIPATION TYPE: ICD-10-CM

## 2019-02-12 DIAGNOSIS — K57.91 DIVERTICULOSIS OF INTESTINE WITH BLEEDING, UNSPECIFIED INTESTINAL TRACT LOCATION: ICD-10-CM

## 2019-02-12 DIAGNOSIS — R10.32 LEFT LOWER QUADRANT PAIN: Primary | ICD-10-CM

## 2019-02-12 DIAGNOSIS — K21.9 GASTROESOPHAGEAL REFLUX DISEASE, ESOPHAGITIS PRESENCE NOT SPECIFIED: ICD-10-CM

## 2019-02-12 DIAGNOSIS — K63.5 POLYP OF COLON, UNSPECIFIED PART OF COLON, UNSPECIFIED TYPE: ICD-10-CM

## 2019-02-12 PROCEDURE — 99214 OFFICE O/P EST MOD 30 MIN: CPT | Performed by: INTERNAL MEDICINE

## 2019-02-12 RX ORDER — AZITHROMYCIN 250 MG/1
TABLET, FILM COATED ORAL
COMMUNITY
End: 2019-04-02

## 2019-02-12 RX ORDER — SERTRALINE HYDROCHLORIDE 25 MG/1
TABLET, FILM COATED ORAL
COMMUNITY
End: 2019-04-02

## 2019-02-12 RX ORDER — CITALOPRAM 10 MG/1
TABLET ORAL
COMMUNITY
Start: 2018-12-19 | End: 2019-04-02

## 2019-02-12 NOTE — PROGRESS NOTES
PATIENT INFORMATION  Mer Ramos       - 1945    CHIEF COMPLAINT  Chief Complaint   Patient presents with   • Abdominal Pain   • Constipation   • Heartburn       HISTORY OF PRESENT ILLNESS  HPI  72 yo known to me with history of diverticulitis and colon polyps. She presents with 8 months of llq pain, fairly constant, no radiation. Some nausea this week  Without fevers. She does not think this gets better with bm. She does have chronic constipation and takes miralax prn.   No blood in stool. Last cls was in  with 6 polyps and at least 4/6 were TA.  Last episode of diverticulitis was ?.  MRI done in April for adrenal adenoma and found gs. She had surgery for gs in April.    REVIEW OF SYSTEMS  Review of Systems   HENT: Positive for sinus pressure.    Eyes: Positive for visual disturbance.   Gastrointestinal: Positive for abdominal pain, constipation and nausea.        Reflux   Genitourinary: Positive for flank pain.   Musculoskeletal: Positive for arthralgias and gait problem.   Neurological: Positive for dizziness.   All other systems reviewed and are negative.        ACTIVE PROBLEMS  Patient Active Problem List    Diagnosis   • Calculus of gallbladder without cholecystitis without obstruction [K80.20]   • Benign essential hypertension [I10]   • Chest pain [R07.9]   • Hyperlipidemia [E78.5]   • Statin intolerance [Z78.9]         PAST MEDICAL HISTORY  Past Medical History:   Diagnosis Date   • Adrenal adenoma     benign   • Arthritis     generalized   • Cancer (CMS/HCC)     skin cancers removed   • Chronic back pain     DDD, bulging disc, and pinched nerves   • Colon polyp    • Gallstones     sched lap maki   • GERD (gastroesophageal reflux disease)    • Hypertension    • PONV (postoperative nausea and vomiting)    • Thyroid disease     hypothyroidism d/t thyroidectomy   • UTI (urinary tract infection)     occasional         SURGICAL HISTORY  Past Surgical History:   Procedure Laterality Date   •  BREAST SURGERY Bilateral     fibroid adenomas removed   • CHOLECYSTECTOMY N/A 4/20/2018    Procedure: CHOLECYSTECTOMY LAPAROSCOPIC;  Surgeon: Poli Mcfarland MD;  Location: [facility] OR;  Service: General   • COLONOSCOPY     • COLONOSCOPY N/A 8/22/2016    Procedure: COLONOSCOPY with polypectomy;  Surgeon: Angelia Catherine MD;  Location:  [facility] OR;  Service:    • HYSTERECTOMY     • LIPOMA EXCISION Left     buttock   • THYROID SURGERY      had partial, and rest removed 9/2017   • TUBAL ABDOMINAL LIGATION           FAMILY HISTORY  Family History   Problem Relation Age of Onset   • Breast cancer Maternal Grandmother    • Hypertension Mother    • Irregular heart beat Mother    • Colon polyps Mother    • Heart disease Father    • Hypertension Father    • Malig Hyperthermia Neg Hx    • Colon cancer Neg Hx          SOCIAL HISTORY  Social History     Occupational History   • Not on file   Tobacco Use   • Smoking status: Never Smoker   • Smokeless tobacco: Never Used   Substance and Sexual Activity   • Alcohol use: No   • Drug use: No   • Sexual activity: Defer       Debilities/Disabilities Identified: None    Emotional Behavior: Appropriate    CURRENT MEDICATIONS    Current Outpatient Medications:   •  acetaminophen (TYLENOL) 500 MG tablet, Take 1,000 mg by mouth Every 6 (Six) Hours As Needed for Mild Pain ., Disp: , Rfl:   •  ALPRAZolam (XANAX) 0.5 MG tablet, Take 0.5 mg by mouth 2 (Two) Times a Day As Needed for Anxiety., Disp: , Rfl:   •  azithromycin (ZITHROMAX) 250 MG tablet, azithromycin 250 mg tablet, Disp: , Rfl:   •  citalopram (CeleXA) 10 MG tablet, , Disp: , Rfl:   •  famotidine (PEPCID) 10 MG tablet, Take 10 mg by mouth 2 (Two) Times a Day As Needed for Indigestion or Heartburn., Disp: , Rfl:   •  levothyroxine (SYNTHROID, LEVOTHROID) 112 MCG tablet, Take 112 mcg by mouth Daily., Disp: , Rfl:   •  losartan (COZAAR) 50 MG tablet, Take 50 mg by mouth Daily., Disp: , Rfl:   •  nitrofurantoin, macrocrystal-monohydrate,  "(MACROBID) 100 MG capsule, Take 1 capsule by mouth 2 (Two) Times a Day., Disp: 14 capsule, Rfl: 0  •  sertraline (ZOLOFT) 25 MG tablet, sertraline 25 mg tablet, Disp: , Rfl:     ALLERGIES  Ace inhibitors; Bactrim [sulfamethoxazole-trimethoprim]; Ceclor [cefaclor]; Ciprocinonide [fluocinolone]; Ciprofloxacin; Codeine; Doxycycline; Ezetimibe; Hydrocodone; Levaquin [levofloxacin]; Penicillins; Sulfa antibiotics; and Tegretol [carbamazepine]    VITALS  Vitals:    02/12/19 1325   BP: 132/78   Weight: 91.9 kg (202 lb 9.6 oz)   Height: 165.1 cm (65\")       LAST RESULTS   Admission on 06/30/2018, Discharged on 06/30/2018   Component Date Value Ref Range Status   • Color 06/30/2018 Yellow  Yellow, Straw, Dark Yellow, Chantale Final   • Clarity, UA 06/30/2018 Clear  Clear Final   • Glucose, UA 06/30/2018 Negative  Negative, 1000 mg/dL (3+) mg/dL Final   • Bilirubin 06/30/2018 Negative  Negative Final   • Ketones, UA 06/30/2018 Negative  Negative Final   • Specific Gravity  06/30/2018 1.005  1.005 - 1.030 Final   • Blood, UA 06/30/2018 Trace* Negative Final   • pH, Urine 06/30/2018 6.5  5.0 - 8.0 Final   • Protein, POC 06/30/2018 Negative  Negative mg/dL Final   • Urobilinogen, UA 06/30/2018 Normal  Normal Final   • Leukocytes 06/30/2018 Small (1+)* Negative Final   • Nitrite, UA 06/30/2018 Negative  Negative Final     No results found.    PHYSICAL EXAM  Physical Exam   Constitutional: She is oriented to person, place, and time. She appears well-developed and well-nourished. No distress.   HENT:   Head: Normocephalic and atraumatic.   Mouth/Throat: Oropharynx is clear and moist.   Eyes: EOM are normal. Pupils are equal, round, and reactive to light.   Neck: Normal range of motion. No tracheal deviation present.   Cardiovascular: Normal rate, regular rhythm, normal heart sounds and intact distal pulses. Exam reveals no gallop and no friction rub.   No murmur heard.  Pulmonary/Chest: Effort normal and breath sounds normal. No " stridor. No respiratory distress. She has no wheezes. She has no rales. She exhibits no tenderness.   Abdominal: Soft. Bowel sounds are normal. She exhibits no distension. There is tenderness. There is no rebound and no guarding.   LLQ pain, LUQ pain   Musculoskeletal: She exhibits no edema.   Lymphadenopathy:     She has no cervical adenopathy.   Neurological: She is alert and oriented to person, place, and time.   Skin: Skin is warm. She is not diaphoretic.   Psychiatric: She has a normal mood and affect. Her behavior is normal. Judgment and thought content normal.   Nursing note and vitals reviewed.      ASSESSMENT  Diagnoses and all orders for this visit:    Left lower quadrant pain  -     CT Abdomen Pelvis With Contrast; Future    Constipation, unspecified constipation type  -     CT Abdomen Pelvis With Contrast; Future    Gastroesophageal reflux disease, esophagitis presence not specified  -     CT Abdomen Pelvis With Contrast; Future    Diverticulosis of intestine with bleeding, unspecified intestinal tract location  -     CT Abdomen Pelvis With Contrast; Future    Polyp of colon, unspecified part of colon, unspecified type  -     CT Abdomen Pelvis With Contrast; Future    Other orders  -     azithromycin (ZITHROMAX) 250 MG tablet; azithromycin 250 mg tablet  -     sertraline (ZOLOFT) 25 MG tablet; sertraline 25 mg tablet  -     citalopram (CeleXA) 10 MG tablet;           PLAN  No Follow-up on file.    Will get ct and treat if it looks like diverticulitis.  She has multiple allergies to abx.  miralax daily  If ct ok, plan cls.

## 2019-02-14 ENCOUNTER — APPOINTMENT (OUTPATIENT)
Dept: CT IMAGING | Facility: HOSPITAL | Age: 74
End: 2019-02-14

## 2019-02-19 ENCOUNTER — HOSPITAL ENCOUNTER (OUTPATIENT)
Dept: CT IMAGING | Facility: HOSPITAL | Age: 74
Discharge: HOME | End: 2019-02-19
Admitting: INTERNAL MEDICINE

## 2019-02-19 DIAGNOSIS — K59.00 CONSTIPATION, UNSPECIFIED CONSTIPATION TYPE: ICD-10-CM

## 2019-02-19 DIAGNOSIS — K63.5 POLYP OF COLON, UNSPECIFIED PART OF COLON, UNSPECIFIED TYPE: ICD-10-CM

## 2019-02-19 DIAGNOSIS — R10.32 LEFT LOWER QUADRANT PAIN: ICD-10-CM

## 2019-02-19 DIAGNOSIS — K57.91 DIVERTICULOSIS OF INTESTINE WITH BLEEDING, UNSPECIFIED INTESTINAL TRACT LOCATION: ICD-10-CM

## 2019-02-19 DIAGNOSIS — K21.9 GASTROESOPHAGEAL REFLUX DISEASE, ESOPHAGITIS PRESENCE NOT SPECIFIED: ICD-10-CM

## 2019-02-19 PROCEDURE — 0 DIATRIZOATE MEGLUMINE & SODIUM PER 1 ML: Performed by: INTERNAL MEDICINE

## 2019-02-19 PROCEDURE — 74177 CT ABD & PELVIS W/CONTRAST: CPT

## 2019-02-19 PROCEDURE — 0 IOPAMIDOL PER 1 ML: Performed by: INTERNAL MEDICINE

## 2019-02-19 RX ADMIN — IOPAMIDOL 100 ML: 755 INJECTION, SOLUTION INTRAVENOUS at 11:33

## 2019-02-19 RX ADMIN — DIATRIZOATE MEGLUMINE AND DIATRIZOATE SODIUM 30 ML: 600; 100 SOLUTION ORAL; RECTAL at 10:01

## 2019-02-20 NOTE — PROGRESS NOTES
CT without with evidence of diverticulosis.  No diverticulitis.  Small left adrenal adenoma.  This appears stable in size from her previous studies.

## 2021-01-15 ENCOUNTER — OFFICE VISIT (OUTPATIENT)
Dept: FAMILY MEDICINE CLINIC | Facility: CLINIC | Age: 76
End: 2021-01-15

## 2021-01-15 VITALS
TEMPERATURE: 97.5 F | OXYGEN SATURATION: 97 % | WEIGHT: 210.6 LBS | SYSTOLIC BLOOD PRESSURE: 136 MMHG | DIASTOLIC BLOOD PRESSURE: 82 MMHG | BODY MASS INDEX: 35.09 KG/M2 | HEIGHT: 65 IN | HEART RATE: 88 BPM

## 2021-01-15 DIAGNOSIS — F51.01 PRIMARY INSOMNIA: Chronic | ICD-10-CM

## 2021-01-15 DIAGNOSIS — I10 BENIGN ESSENTIAL HYPERTENSION: Primary | Chronic | ICD-10-CM

## 2021-01-15 DIAGNOSIS — E06.3 HYPOTHYROIDISM DUE TO HASHIMOTO'S THYROIDITIS: Chronic | ICD-10-CM

## 2021-01-15 DIAGNOSIS — Z79.899 ENCOUNTER FOR LONG-TERM (CURRENT) USE OF MEDICATIONS: ICD-10-CM

## 2021-01-15 DIAGNOSIS — E03.8 HYPOTHYROIDISM DUE TO HASHIMOTO'S THYROIDITIS: Chronic | ICD-10-CM

## 2021-01-15 DIAGNOSIS — R30.0 DYSURIA: ICD-10-CM

## 2021-01-15 LAB
BILIRUB BLD-MCNC: NEGATIVE MG/DL
CLARITY, POC: CLEAR
COLOR UR: YELLOW
GLUCOSE UR STRIP-MCNC: NEGATIVE MG/DL
KETONES UR QL: NEGATIVE
LEUKOCYTE EST, POC: NEGATIVE
NITRITE UR-MCNC: NEGATIVE MG/ML
PH UR: 6 [PH] (ref 5–8)
PROT UR STRIP-MCNC: NEGATIVE MG/DL
RBC # UR STRIP: NEGATIVE /UL
SP GR UR: 1.01 (ref 1–1.03)
UROBILINOGEN UR QL: NORMAL

## 2021-01-15 PROCEDURE — 81003 URINALYSIS AUTO W/O SCOPE: CPT | Performed by: FAMILY MEDICINE

## 2021-01-15 PROCEDURE — 99204 OFFICE O/P NEW MOD 45 MIN: CPT | Performed by: FAMILY MEDICINE

## 2021-01-15 RX ORDER — TRAZODONE HYDROCHLORIDE 50 MG/1
TABLET ORAL
Qty: 60 TABLET | Refills: 0 | Status: SHIPPED | OUTPATIENT
Start: 2021-01-15 | End: 2021-02-20

## 2021-01-15 RX ORDER — TRIAMTERENE AND HYDROCHLOROTHIAZIDE 37.5; 25 MG/1; MG/1
1 TABLET ORAL DAILY
COMMUNITY
End: 2021-02-20

## 2021-01-15 RX ORDER — TRIAMCINOLONE ACETONIDE 1 MG/G
CREAM TOPICAL 2 TIMES DAILY
COMMUNITY
End: 2021-02-20

## 2021-01-15 NOTE — PROGRESS NOTES
Chief Complaint  Establish Care    Subjective          Mer Ramos presents to [facility] for   Patient is here today as a new patient to me.  She used to be seen by Dr. Lagunas.  She comes today with a ongoing problem for her over the last several years of vertigo and now balance issues.  She has been seen by ENT and had a full work-up she is also been seen by Dr. Ram, neurology,who performed a MRI which just so it showed some chronic small vessel changes.  She is still seeing neurology.  The neurologist is wanting to perform an MRI of the spine now.  Dr. Lagunas started the patient on gabapentin which just seemed to make the dizziness and balance issues worse. So she stopped it.      She is also here to follow up on her chronic medical problems:  Patient was diagnosed with hypertension in the year 2000.  She currently takes valsartan 160 mg daily and Maxide one half tablet every other day.  She was recently seen at the ER and told that her kidney function was slightly abnormal.  She had never been told that before.  This was during an infection with Covid.    Also the patient was diagnosed with Hashimoto's thyroiditis in 1977.  She then had a L subtotal thyroidectomy,  in 2005 and then the right side was removed in 2018.  She currently takes levothyroxine 112 mcg daily.    Insomnia.   The patient has been on Xanax for anxiety and sleep for 9 years.  She was started on it after the loss of her mother so that she could sleep.  The only other medication she is taken was Ativan and she had a unusual reaction to it.  It kept her awake.  The patient has never been started on any SSRIs or SNRIs.  She did try not to take it but woke up after just a few hours and was unable to get back to sleep.    She is also here for a new problem. She has a history of frequent urinary tract infections.  She is concerned she might have one currently.  She just noticed some urinary frequency and a  "different odor to her urine.  The last time she was given an antibiotic for a urinary tract infection was August or September of last year.  Although the patient has an allergy(itching) to Ceclor she has not had any problems taking Keflex.        Objective   Vital Signs:   /82   Pulse 88   Temp 97.5 °F (36.4 °C)   Ht 165.1 cm (65\")   Wt 95.5 kg (210 lb 9.6 oz)   SpO2 97%   BMI 35.05 kg/m²     Physical Exam  Vitals signs and nursing note reviewed.   Constitutional:       Appearance: Normal appearance. She is well-developed.   HENT:      Head: Normocephalic and atraumatic.   Eyes:      General: No scleral icterus.     Conjunctiva/sclera: Conjunctivae normal.   Neck:      Musculoskeletal: Normal range of motion and neck supple.   Cardiovascular:      Rate and Rhythm: Normal rate and regular rhythm.      Heart sounds: Normal heart sounds.   Pulmonary:      Effort: Pulmonary effort is normal.      Breath sounds: Normal breath sounds.   Abdominal:      General: Bowel sounds are normal.      Palpations: Abdomen is soft.      Tenderness: There is no right CVA tenderness or left CVA tenderness.   Musculoskeletal: Normal range of motion.      Right lower leg: Edema (trace ankle) present.      Left lower leg: Edema (trace ankle) present.   Skin:     General: Skin is warm and dry.      Capillary Refill: Capillary refill takes less than 2 seconds.      Findings: No rash.   Neurological:      General: No focal deficit present.      Mental Status: She is alert and oriented to person, place, and time.   Psychiatric:         Mood and Affect: Mood normal.         Behavior: Behavior normal.         Thought Content: Thought content normal.         Judgment: Judgment normal.        Result Review :   The following data was reviewed by: Jeannette Merritt DO on 01/15/2021:  Common labs    Common Labsle 10/20/20 12/3/20   WBC 4.73 4.86   Hemoglobin 11.2 (A) 11.4 (A)   Hematocrit 33.1 (A) 34.4 (A)   Platelets 162 162   (A) " Abnormal value                UA    Urinalysis 1/15/21   Ketones, UA Negative   Leukocytes, UA Negative                         Assessment and Plan    Problem List Items Addressed This Visit        Cardiac and Vasculature    Benign essential hypertension - Primary    Relevant Medications    triamterene-hydrochlorothiazide (MAXZIDE-25) 37.5-25 MG per tablet    Other Relevant Orders    Comprehensive Metabolic Panel      Other Visit Diagnoses     Hypothyroidism due to Hashimoto's thyroiditis  (Chronic)       Relevant Orders    TSH    Primary insomnia  (Chronic)       Relevant Medications    traZODone (DESYREL) 50 MG tablet    Dysuria        Relevant Orders    POC Urinalysis Dipstick, Automated (Completed)    Urine Culture - Urine, Urine, Clean Catch    Encounter for long-term (current) use of medications        Relevant Orders    CBC & Differential    Comprehensive Metabolic Panel    TSH        UA all normal so I will wait on UC results before treating.  Changed Xanax to Trazodone for sleep.  Might consider changing to Remeron 7.5mg if that doesn't work or an SSRI.    Patient seen today for stable chronic HTN and hypothyroid.  Surveillance labs were obtained today and any medication changes will be made based on lab results and will be called to the patient later this week.      Follow Up   Return in about 4 weeks (around 2/12/2021) for Insomnia, HTN, Thyroid.  Patient was given instructions and counseling regarding her condition or for health maintenance advice. Please see specific information pulled into the AVS if appropriate.

## 2021-01-16 DIAGNOSIS — E03.8 HYPOTHYROIDISM DUE TO HASHIMOTO'S THYROIDITIS: Primary | ICD-10-CM

## 2021-01-16 DIAGNOSIS — E06.3 HYPOTHYROIDISM DUE TO HASHIMOTO'S THYROIDITIS: Primary | ICD-10-CM

## 2021-01-16 LAB
ALBUMIN SERPL-MCNC: 4.5 G/DL (ref 3.7–4.7)
ALBUMIN/GLOB SERPL: 1.5 {RATIO} (ref 1.2–2.2)
ALP SERPL-CCNC: 37 IU/L (ref 39–117)
ALT SERPL-CCNC: 12 IU/L (ref 0–32)
AST SERPL-CCNC: 21 IU/L (ref 0–40)
BASOPHILS # BLD AUTO: 0.1 X10E3/UL (ref 0–0.2)
BASOPHILS NFR BLD AUTO: 2 %
BILIRUB SERPL-MCNC: 0.4 MG/DL (ref 0–1.2)
BUN SERPL-MCNC: 11 MG/DL (ref 8–27)
BUN/CREAT SERPL: 11 (ref 12–28)
CALCIUM SERPL-MCNC: 9.4 MG/DL (ref 8.7–10.3)
CHLORIDE SERPL-SCNC: 101 MMOL/L (ref 96–106)
CO2 SERPL-SCNC: 26 MMOL/L (ref 20–29)
CREAT SERPL-MCNC: 0.98 MG/DL (ref 0.57–1)
EOSINOPHIL # BLD AUTO: 0.1 X10E3/UL (ref 0–0.4)
EOSINOPHIL NFR BLD AUTO: 3 %
ERYTHROCYTE [DISTWIDTH] IN BLOOD BY AUTOMATED COUNT: 13.9 % (ref 11.7–15.4)
GLOBULIN SER CALC-MCNC: 3 G/DL (ref 1.5–4.5)
GLUCOSE SERPL-MCNC: 92 MG/DL (ref 65–99)
HCT VFR BLD AUTO: 34.9 % (ref 34–46.6)
HGB BLD-MCNC: 12.1 G/DL (ref 11.1–15.9)
IMM GRANULOCYTES # BLD AUTO: 0 X10E3/UL (ref 0–0.1)
IMM GRANULOCYTES NFR BLD AUTO: 0 %
LYMPHOCYTES # BLD AUTO: 1.6 X10E3/UL (ref 0.7–3.1)
LYMPHOCYTES NFR BLD AUTO: 31 %
MCH RBC QN AUTO: 28.9 PG (ref 26.6–33)
MCHC RBC AUTO-ENTMCNC: 34.7 G/DL (ref 31.5–35.7)
MCV RBC AUTO: 83 FL (ref 79–97)
MONOCYTES # BLD AUTO: 0.4 X10E3/UL (ref 0.1–0.9)
MONOCYTES NFR BLD AUTO: 8 %
NEUTROPHILS # BLD AUTO: 3 X10E3/UL (ref 1.4–7)
NEUTROPHILS NFR BLD AUTO: 56 %
PLATELET # BLD AUTO: 178 X10E3/UL (ref 150–450)
POTASSIUM SERPL-SCNC: 3.8 MMOL/L (ref 3.5–5.2)
PROT SERPL-MCNC: 7.5 G/DL (ref 6–8.5)
RBC # BLD AUTO: 4.19 X10E6/UL (ref 3.77–5.28)
SODIUM SERPL-SCNC: 139 MMOL/L (ref 134–144)
TSH SERPL DL<=0.005 MIU/L-ACNC: 4.12 UIU/ML (ref 0.45–4.5)
WBC # BLD AUTO: 5.2 X10E3/UL (ref 3.4–10.8)

## 2021-01-16 RX ORDER — LEVOTHYROXINE SODIUM 112 UG/1
112 TABLET ORAL DAILY
Qty: 90 TABLET | Refills: 0 | Status: SHIPPED | OUTPATIENT
Start: 2021-01-16 | End: 2021-04-09 | Stop reason: SDUPTHER

## 2021-01-17 LAB
BACTERIA UR CULT: NO GROWTH
BACTERIA UR CULT: NORMAL

## 2021-01-18 ENCOUNTER — TELEPHONE (OUTPATIENT)
Dept: FAMILY MEDICINE CLINIC | Facility: CLINIC | Age: 76
End: 2021-01-18

## 2021-01-18 NOTE — TELEPHONE ENCOUNTER
PT SAID SHE STARTED THE TRAZADONE ON Friday. SHE SAID IT IS MAKING HER FIDGET A LOT. SHE SAID SHE HAD A PANIC ATTACK LAST WEEK AS WELL.

## 2021-01-18 NOTE — TELEPHONE ENCOUNTER
If she is doing okay without taking Xanax then I would encourage her to stay off of the it as much as possible.  It sounds like her symptoms of fidgetiness and even panic attacks may be brought on by the withdrawal from the Xanax.  It is less likely that those are side effects of the trazodone.

## 2021-02-25 ENCOUNTER — OFFICE VISIT (OUTPATIENT)
Dept: FAMILY MEDICINE CLINIC | Facility: CLINIC | Age: 76
End: 2021-02-25

## 2021-02-25 VITALS
DIASTOLIC BLOOD PRESSURE: 88 MMHG | TEMPERATURE: 96.8 F | BODY MASS INDEX: 34.05 KG/M2 | HEIGHT: 65 IN | WEIGHT: 204.4 LBS | SYSTOLIC BLOOD PRESSURE: 140 MMHG | OXYGEN SATURATION: 96 % | HEART RATE: 88 BPM

## 2021-02-25 DIAGNOSIS — Z12.31 ENCOUNTER FOR SCREENING MAMMOGRAM FOR MALIGNANT NEOPLASM OF BREAST: Primary | ICD-10-CM

## 2021-02-25 DIAGNOSIS — M54.50 ACUTE RIGHT-SIDED LOW BACK PAIN WITHOUT SCIATICA: ICD-10-CM

## 2021-02-25 DIAGNOSIS — F43.23 ADJUSTMENT DISORDER WITH MIXED ANXIETY AND DEPRESSED MOOD: ICD-10-CM

## 2021-02-25 DIAGNOSIS — I10 BENIGN ESSENTIAL HYPERTENSION: ICD-10-CM

## 2021-02-25 PROCEDURE — 99214 OFFICE O/P EST MOD 30 MIN: CPT | Performed by: FAMILY MEDICINE

## 2021-02-25 RX ORDER — ESCITALOPRAM OXALATE 5 MG/1
5 TABLET ORAL EVERY EVENING
Qty: 30 TABLET | Refills: 1 | Status: SHIPPED | OUTPATIENT
Start: 2021-02-25 | End: 2021-03-03 | Stop reason: SINTOL

## 2021-02-25 NOTE — PROGRESS NOTES
"Chief Complaint  Hypertension (Rt side rib and back pain ), Back Pain, and Anxiety    Subjective          Mer Ramos presents to [facility]  Patient is here today with a new problem.  She went to  a box off of her front porch about 2 weeks ago when after lifting it she noticed immediate back pain.  It was located on the right side in the mid to lower back region.  She does have back problems.  She denies any numbness or tingling or referred pain.  She has noticed improvement but the pain does still occur especially with certain movements or coughing.  She has used ice and heat and a lidocaine patch, all of which have helped some.  She tried taking methocarbamol but it tore up her stomach so she was unable to continue it.    Also the patient is here to follow-up on anxiety and depression.  The patient stopped taking Xanax on January 15 and tried trazodone but had itching, legs were drawing up, and she could not sleep.  Therefore she just stopped taking both.  She has not tried anything new.  She has also felt much more anxious overall.  She is having difficulty with sleep every night.  She also feels more depressed at times but also like her stomach is in and not.    Hypertension.  The patient's blood pressures at home have ranged from 112/73 - 130/78 at the highest.  She takes valsartan 160 mg daily and denies any side effects.  We did stop triamterene/HCTZ a month ago.         Objective   Vital Signs:   /88 (BP Location: Left arm, Patient Position: Sitting, Cuff Size: Adult)   Pulse 88   Temp 96.8 °F (36 °C) (Temporal)   Ht 163.8 cm (64.5\")   Wt 92.7 kg (204 lb 6.4 oz)   SpO2 96%   BMI 34.54 kg/m²     Physical Exam  Vitals signs and nursing note reviewed.   Constitutional:       Appearance: She is well-developed.   HENT:      Head: Normocephalic and atraumatic.   Eyes:      Conjunctiva/sclera: Conjunctivae normal.   Neck:      Musculoskeletal: Normal range of " motion and neck supple.   Cardiovascular:      Rate and Rhythm: Normal rate and regular rhythm.      Heart sounds: Normal heart sounds.   Pulmonary:      Effort: Pulmonary effort is normal.      Breath sounds: Normal breath sounds.   Abdominal:      General: Bowel sounds are normal.      Palpations: Abdomen is soft.   Musculoskeletal: Normal range of motion.   Skin:     General: Skin is warm and dry.      Capillary Refill: Capillary refill takes less than 2 seconds.      Findings: No rash.   Neurological:      Mental Status: She is alert and oriented to person, place, and time.   Psychiatric:         Mood and Affect: Mood normal.         Behavior: Behavior normal.         Thought Content: Thought content normal.         Judgment: Judgment normal.        Result Review :   The following data was reviewed by: Jeannette Merritt DO on 02/25/2021:  Common labs    Common Labsle 10/20/20 12/3/20 1/15/21 1/15/21      1407 1407   Glucose    92   BUN    11   Creatinine    0.98   eGFR Non  Am    57 (A)   eGFR African Am    66   Sodium    139   Potassium    3.8   Chloride    101   Calcium    9.4   Total Protein    7.5   Albumin    4.5   Total Bilirubin    0.4   Alkaline Phosphatase    37 (A)   AST (SGOT)    21   ALT (SGPT)    12   WBC 4.73 4.86 5.2    Hemoglobin 11.2 (A) 11.4 (A) 12.1    Hematocrit 33.1 (A) 34.4 (A) 34.9    Platelets 162 162 178    (A) Abnormal value            TSH    TSH 1/15/21   TSH 4.120                     Assessment and Plan    Diagnoses and all orders for this visit:    1. Encounter for screening mammogram for malignant neoplasm of breast (Primary)  -     Mammo Screening Bilateral With CAD; Future    2. Adjustment disorder with mixed anxiety and depressed mood  -     escitalopram (Lexapro) 5 MG tablet; Take 1 tablet by mouth Every Evening.  Dispense: 30 tablet; Refill: 1    3. Acute right-sided low back pain without sciatica    4. Benign essential hypertension    Patient will follow the exercises  below to help her back improve over the next several weeks.  If she starts having any additional pain or symptoms she should follow-up.  Also I will start the patient on a new prescription for Lexapro to help with the anxiety, sleep and depression symptoms.  We did discuss side effects.  I also advised the patient that after a couple of days on the Lexapro if she is still not sleeping well she may take melatonin up to 5 mg nightly.  Continue checking blood pressures at home and follow-up if any are greater than 135/85 consistently.      Follow Up   Return in about 6 weeks (around 4/8/2021) for Anxiety, HTN, insomnia.  Patient was given instructions and counseling regarding her condition or for health maintenance advice. Please see specific information pulled into the AVS if appropriate.

## 2021-03-01 ENCOUNTER — TELEPHONE (OUTPATIENT)
Dept: FAMILY MEDICINE CLINIC | Facility: CLINIC | Age: 76
End: 2021-03-01

## 2021-03-01 NOTE — TELEPHONE ENCOUNTER
Patient may just discontinue taking Lexapro and follow-up with me when she is able.  There is no urgency in her follow-up unless her chest pain continues.

## 2021-03-01 NOTE — TELEPHONE ENCOUNTER
Caller: Mer Ramos    Relationship to patient: Self    Best call back number: [phone]    Patient is needing:     PATIENT CALLED IN STATING SHE STARTED A NEW MEDICATION:  escitalopram (Lexapro) 5 MG tablet THIS PAST Thursday NIGHT AND SINCE THEN SHE'S BEEN EXPERIENCING CHEST PAIN (ONLY WHEN SHE TAKES THE MEDICATION )    SHE ALSO STATED SHES BEEN BURPING A LOT AND THIS IS SOMETHING SHE IS NOT USED TO AND NEVER DOES.    I TOLD PATIENT THAT THE CHEST PAIN IS A CONCERN FOR US AND WE NORMALLY ADVISE PATIENTS TO GO TO THE ER WITH THESE SYMPTOMS AND SHE INSISTS THE PAIN IS FROM THE PILL...    I CALLED THE FRONT OFFICE TO EXPLAIN WHAT WAS GOING ON AND MY CONCERNS WITH HER CHEST PAIN, BUT PATIENT NEVER EXPERIENCES THIS STUFF AND ONLY DOES WHEN SHE TAKES THE Lexapro- PATIENT TOLD ME SHE DIDN'T TAKE IT LAST NIGHT- AND WANTED TO TALK WITH DR RENE ABOUT MAYBE CHANGING MEDICATIONS.    THE FRONT OFFICE TOLD ME TO TRY AND SCHEDULE HER AN APTT FOR TODAY, BUT PATIENT IS UNABLE TO COME INTO THE OFFICE DUE TO HER DAUGHTER HAVING A:    XRAY TODAY,  COVID TEST TOMORROW, SURGERY Thursday OR Friday.     AND HAS THE VEHICLE CURRENTLY.      PLEASE CALL PATIENT AND ADVISE WHAT WE SHOULD DO.     [facility] [address] [place] [address] - [address] [place] [address] - [phone] [facility] [phone] FX

## 2021-03-01 NOTE — TELEPHONE ENCOUNTER
PT CALLED BACK AND LVM STATING SHE DID NOT HEAR PHONE RING BUT HAD MISSED CALL FROM US AT 1:39.  I CALLED HER BACK X [phone], PHONE RANG 1 TIME THEN STATED VM NOT SET UP

## 2021-03-03 ENCOUNTER — TELEPHONE (OUTPATIENT)
Dept: FAMILY MEDICINE CLINIC | Facility: CLINIC | Age: 76
End: 2021-03-03

## 2021-03-03 ENCOUNTER — OFFICE VISIT (OUTPATIENT)
Dept: FAMILY MEDICINE CLINIC | Facility: CLINIC | Age: 76
End: 2021-03-03

## 2021-03-03 VITALS
BODY MASS INDEX: 34.29 KG/M2 | HEART RATE: 90 BPM | WEIGHT: 205.8 LBS | OXYGEN SATURATION: 98 % | SYSTOLIC BLOOD PRESSURE: 136 MMHG | TEMPERATURE: 98.2 F | DIASTOLIC BLOOD PRESSURE: 83 MMHG | HEIGHT: 65 IN

## 2021-03-03 DIAGNOSIS — F43.23 ADJUSTMENT DISORDER WITH MIXED ANXIETY AND DEPRESSED MOOD: ICD-10-CM

## 2021-03-03 DIAGNOSIS — Z12.31 ENCOUNTER FOR SCREENING MAMMOGRAM FOR MALIGNANT NEOPLASM OF BREAST: ICD-10-CM

## 2021-03-03 DIAGNOSIS — Z00.00 ENCOUNTER FOR SUBSEQUENT ANNUAL WELLNESS VISIT IN MEDICARE PATIENT: Primary | ICD-10-CM

## 2021-03-03 PROCEDURE — 99214 OFFICE O/P EST MOD 30 MIN: CPT | Performed by: FAMILY MEDICINE

## 2021-03-03 PROCEDURE — G0439 PPPS, SUBSEQ VISIT: HCPCS | Performed by: FAMILY MEDICINE

## 2021-03-03 PROCEDURE — 1160F RVW MEDS BY RX/DR IN RCRD: CPT | Performed by: FAMILY MEDICINE

## 2021-03-03 PROCEDURE — 1170F FXNL STATUS ASSESSED: CPT | Performed by: FAMILY MEDICINE

## 2021-03-03 PROCEDURE — 96160 PT-FOCUSED HLTH RISK ASSMT: CPT | Performed by: FAMILY MEDICINE

## 2021-03-03 RX ORDER — SERTRALINE HYDROCHLORIDE 25 MG/1
25 TABLET, FILM COATED ORAL EVERY EVENING
Qty: 30 TABLET | Refills: 1 | Status: SHIPPED | OUTPATIENT
Start: 2021-03-03 | End: 2021-04-08 | Stop reason: SDUPTHER

## 2021-03-03 NOTE — TELEPHONE ENCOUNTER
----- Message from Jeannette Merritt DO sent at 3/3/2021 10:44 AM EST -----  Will you please contact [facility] and get the results of the patient's last mammogram?  She believes it was done in November 2020.  Also, she had a DEXA scan done in 2019.  Will you get the results of those as well? Thank you!

## 2021-03-03 NOTE — PROGRESS NOTES
The ABCs of the Annual Wellness Visit  Subsequent Medicare Wellness Visit    Chief Complaint   Patient presents with   • Medicare Wellness-subsequent     Patient is also here today to follow-up on anxiety and depression.  We started her on Lexapro 5 mg last week.  After taking the Lexapro the patient experienced chest pain.  She denies any diaphoresis or shortness of breath but states that it occurred 3 nights in a row immediately following taking Lexapro.  She has no longer had any chest pain now that she discontinued Lexapro.  However, she is having trouble sleeping and her anxiety is still bad.    Subjective   History of Present Illness:  Mer Ramos is a 75 y.o. female who presents for a Subsequent Medicare Wellness Visit.    HEALTH RISK ASSESSMENT    Recent Hospitalizations:  No hospitalization(s) within the last year.    Current Medical Providers:  Patient Care Team:  Jeannette Merritt DO as PCP - General (Family Medicine)  Poli Mcfarland MD as Surgeon (General Surgery)    Smoking Status:  Social History     Tobacco Use   Smoking Status Never Smoker   Smokeless Tobacco Never Used       Alcohol Consumption:  Social History     Substance and Sexual Activity   Alcohol Use Yes   • Frequency: Monthly or less       Depression Screen:   PHQ-2/PHQ-9 Depression Screening 3/3/2021   Little interest or pleasure in doing things 0   Feeling down, depressed, or hopeless 0   Trouble falling or staying asleep, or sleeping too much 1   Feeling tired or having little energy 0   Poor appetite or overeating 1   Feeling bad about yourself - or that you are a failure or have let yourself or your family down 0   Trouble concentrating on things, such as reading the newspaper or watching television 0   Moving or speaking so slowly that other people could have noticed. Or the opposite - being so fidgety or restless that you have been moving around a lot more than usual 0   Thoughts that you would be better off dead, or of hurting  yourself in some way 0   Total Score 2   If you checked off any problems, how difficult have these problems made it for you to do your work, take care of things at home, or get along with other people? Not difficult at all       Fall Risk Screen:  SREE Fall Risk Assessment was completed, and patient is at LOW risk for falls.Assessment completed on:3/3/2021    Health Habits and Functional and Cognitive Screening:  Functional & Cognitive Status 3/3/2021   Do you have difficulty preparing food and eating? No   Do you have difficulty bathing yourself, getting dressed or grooming yourself? No   Do you have difficulty using the toilet? No   Do you have difficulty moving around from place to place? No   Do you have trouble with steps or getting out of a bed or a chair? No   Current Diet Well Balanced Diet   Dental Exam Up to date   Eye Exam Not up to date   Exercise (times per week) 3 times per week   Current Exercises Include Aerobics   Do you need help using the phone?  No   Are you deaf or do you have serious difficulty hearing?  Yes   Do you need help with transportation? No   Do you need help shopping? No   Do you need help preparing meals?  No   Do you need help with housework?  No   Do you need help with laundry? No   Do you need help taking your medications? No   Do you need help managing money? No   Do you ever drive or ride in a car without wearing a seat belt? No   Have you felt unusual stress, anger or loneliness in the last month? Yes   Who do you live with? Child   If you need help, do you have trouble finding someone available to you? No   Have you been bothered in the last four weeks by sexual problems? No   Do you have difficulty concentrating, remembering or making decisions? No         Does the patient have evidence of cognitive impairment? No    Asprin use counseling:Does not need ASA (and currently is not on it)    Age-appropriate Screening Schedule:  Refer to the list below for future screening  recommendations based on patient's age, sex and/or medical conditions. Orders for these recommended tests are listed in the plan section. The patient has been provided with a written plan.    Health Maintenance   Topic Date Due   • DXA SCAN  1945   • TDAP/TD VACCINES (1 - Tdap) 08/16/1965   • ZOSTER VACCINE (1 of 2) 08/16/1996   • MAMMOGRAM  03/29/2016   • LIPID PANEL  04/10/2018   • COLONOSCOPY  08/22/2026   • INFLUENZA VACCINE  Completed          The following portions of the patient's history were reviewed and updated as appropriate: allergies, current medications, past family history, past medical history, past social history, past surgical history and problem list.    Outpatient Medications Prior to Visit   Medication Sig Dispense Refill   • acetaminophen (TYLENOL) 500 MG tablet Take 1,000 mg by mouth Every 6 (Six) Hours As Needed for Mild Pain .     • levothyroxine (SYNTHROID, LEVOTHROID) 112 MCG tablet Take 1 tablet by mouth Daily. 90 tablet 0   • lidocaine (LIDODERM) 5 % Place 1 patch on the skin as directed by provider Daily. Remove & Discard patch within 12 hours or as directed by MD 6 each 0   • valsartan (DIOVAN) 160 MG tablet Take 160 mg by mouth Daily.     • escitalopram (Lexapro) 5 MG tablet Take 1 tablet by mouth Every Evening. 30 tablet 1     No facility-administered medications prior to visit.        Patient Active Problem List   Diagnosis   • Benign essential hypertension   • Chest pain   • Hyperlipidemia   • Statin intolerance   • Calculus of gallbladder without cholecystitis without obstruction       Advanced Care Planning:  ACP discussion was held with the patient during this visit. Patient does not have an advance directive, information provided.    Review of Systems   Constitutional: Negative for activity change, appetite change, fatigue and unexpected weight change.   HENT: Negative for congestion, ear pain, nosebleeds, sore throat and tinnitus.    Eyes: Negative for pain, redness and  "visual disturbance.   Respiratory: Negative for cough, shortness of breath and wheezing.    Cardiovascular: Negative for chest pain, palpitations and leg swelling.   Gastrointestinal: Negative for abdominal pain, blood in stool and nausea.   Endocrine: Negative for polydipsia and polyuria.   Genitourinary: Negative for dysuria, frequency, menstrual problem and vaginal discharge.   Musculoskeletal: Negative for arthralgias, joint swelling and myalgias.   Skin: Negative for rash.   Allergic/Immunologic: Negative for environmental allergies, food allergies and immunocompromised state.   Neurological: Negative for dizziness, speech difficulty, weakness and headaches.   Hematological: Negative for adenopathy. Does not bruise/bleed easily.   Psychiatric/Behavioral: Negative for decreased concentration and dysphoric mood. The patient is not nervous/anxious.        Compared to one year ago, the patient feels her physical health is the same.  Compared to one year ago, the patient feels her mental health is the same.    Reviewed chart for potential of high risk medication in the elderly: yes  Reviewed chart for potential of harmful drug interactions in the elderly:yes    Objective         Vitals:    03/03/21 1008   BP: 136/83   Pulse: 90   Temp: 98.2 °F (36.8 °C)   SpO2: 98%   Weight: 93.4 kg (205 lb 12.8 oz)   Height: 165.1 cm (65\")       Body mass index is 34.25 kg/m².  Discussed the patient's BMI with her. The BMI is above average; BMI management plan is completed.    Physical Exam  Vitals signs and nursing note reviewed.   Constitutional:       Appearance: She is well-developed.   HENT:      Head: Normocephalic and atraumatic.   Eyes:      Conjunctiva/sclera: Conjunctivae normal.   Neck:      Musculoskeletal: Normal range of motion and neck supple.   Cardiovascular:      Rate and Rhythm: Normal rate and regular rhythm.      Heart sounds: Normal heart sounds.   Pulmonary:      Effort: Pulmonary effort is normal.      " Breath sounds: Normal breath sounds.   Abdominal:      General: Bowel sounds are normal.      Palpations: Abdomen is soft.   Musculoskeletal: Normal range of motion.   Skin:     General: Skin is warm and dry.      Capillary Refill: Capillary refill takes less than 2 seconds.      Findings: No rash.   Neurological:      Mental Status: She is alert and oriented to person, place, and time.   Psychiatric:         Behavior: Behavior normal.         Thought Content: Thought content normal.         Judgment: Judgment normal.         Lab Results   Component Value Date    GLU 92 01/15/2021        Assessment/Plan   Medicare Risks and Personalized Health Plan  CMS Preventative Services Quick Reference  Advance Directive Discussion  Breast Cancer/Mammogram Screening  Cardiovascular risk  Colon Cancer Screening  Dementia/Memory   Depression/Dysphoria  Diabetic Lab Screening   Fall Risk  Glaucoma Risk  Immunizations Discussed/Encouraged (specific immunizations; Hepatitis A Vaccine/Series, Hepatitis B Vaccine/Series, Influenza, Pneumococcal 23, Prevnar and Shingrix )  Inactivity/Sedentary  Obesity/Overweight   Osteoprorosis Risk     GASTROENTEROLOGY - SCAN - C/S RESULT LETTER - 08/22/2016 (09/07/2016)    The above risks/problems have been discussed with the patient.  Pertinent information has been shared with the patient in the After Visit Summary.  Follow up plans and orders are seen below in the Assessment/Plan Section.    Diagnoses and all orders for this visit:    1. Encounter for subsequent annual wellness visit in Medicare patient (Primary)    2. Adjustment disorder with mixed anxiety and depressed mood  -     sertraline (ZOLOFT) 25 MG tablet; Take 1 tablet by mouth Every Evening.  Dispense: 30 tablet; Refill: 1    DEXA scan was performed in 2019 I will get the results of that and order follow-up testing depending on timing.  Mammogram was performed in November 2020 I have requested the results and they will be scanned into  the chart upon receipt.  Patient is not fasting today and states that she will fast for lipids in the future.  We can also check a hepatitis C with her next blood draw.    Patient will start sertraline 25 mg every evening.  I advise she take it with food and she may actually start at a lower dose by cutting the tablet in half in the beginning and then titrating up.  She was advised to contact me if she has any issues.  Follow Up:  No follow-ups on file.     An After Visit Summary and PPPS were given to the patient.

## 2021-04-02 ENCOUNTER — TELEPHONE (OUTPATIENT)
Dept: FAMILY MEDICINE CLINIC | Facility: CLINIC | Age: 76
End: 2021-04-02

## 2021-04-02 PROBLEM — M54.50 LOW BACK PAIN: Status: ACTIVE | Noted: 2019-03-20

## 2021-04-02 PROBLEM — R10.9 ABDOMINAL PAIN: Status: ACTIVE | Noted: 2018-12-19

## 2021-04-02 PROBLEM — R53.83 FATIGUE: Status: ACTIVE | Noted: 2020-10-01

## 2021-04-02 PROBLEM — M19.90 OSTEOARTHRITIS: Status: ACTIVE | Noted: 2018-08-22

## 2021-04-02 PROBLEM — R26.89 IMPAIRMENT OF BALANCE: Status: ACTIVE | Noted: 2019-10-16

## 2021-04-02 PROBLEM — D53.9 NUTRITIONAL ANEMIA, UNSPECIFIED: Status: ACTIVE | Noted: 2020-05-22

## 2021-04-02 PROBLEM — R60.0 EDEMA OF BOTH LOWER EXTREMITIES: Status: ACTIVE | Noted: 2019-07-12

## 2021-04-02 PROBLEM — J06.9 ACUTE UPPER RESPIRATORY INFECTION: Status: ACTIVE | Noted: 2018-11-09

## 2021-04-02 PROBLEM — R21 RASH: Status: ACTIVE | Noted: 2019-12-16

## 2021-04-02 PROBLEM — Z91.81 AT RISK FOR FALLS: Status: ACTIVE | Noted: 2019-12-05

## 2021-04-02 PROBLEM — G62.9 NEUROPATHY: Status: ACTIVE | Noted: 2020-02-21

## 2021-04-02 PROBLEM — K21.9 GASTROESOPHAGEAL REFLUX DISEASE: Status: ACTIVE | Noted: 2018-08-22

## 2021-04-02 PROBLEM — R26.9 ABNORMAL GAIT: Status: ACTIVE | Noted: 2019-10-16

## 2021-04-02 PROBLEM — K25.9 MULTIPLE GASTRIC ULCERS: Status: ACTIVE | Noted: 2018-08-22

## 2021-04-02 PROBLEM — M25.50 PAIN IN JOINT: Status: ACTIVE | Noted: 2019-03-20

## 2021-04-02 PROBLEM — E87.6 HYPOKALEMIA: Status: ACTIVE | Noted: 2020-02-21

## 2021-04-02 PROBLEM — J01.10 ACUTE FRONTAL SINUSITIS: Status: ACTIVE | Noted: 2020-06-15

## 2021-04-02 PROBLEM — E89.0 HISTORY OF THYROIDECTOMY: Status: ACTIVE | Noted: 2018-08-22

## 2021-04-02 PROBLEM — T14.8XXA COMPRESSION INJURY OF NERVE: Status: ACTIVE | Noted: 2021-04-02

## 2021-04-02 PROBLEM — K21.00 REFLUX ESOPHAGITIS: Status: ACTIVE | Noted: 2021-04-02

## 2021-04-02 PROBLEM — F41.9 ANXIETY: Status: ACTIVE | Noted: 2018-08-22

## 2021-04-02 PROBLEM — M54.17 LUMBOSACRAL RADICULOPATHY: Status: ACTIVE | Noted: 2020-02-03

## 2021-04-02 PROBLEM — R06.02 SHORTNESS OF BREATH: Status: ACTIVE | Noted: 2020-06-09

## 2021-04-02 PROBLEM — S81.819A LACERATION OF LOWER LIMB: Status: ACTIVE | Noted: 2019-08-06

## 2021-04-02 PROBLEM — R68.89 INFLUENZA-LIKE SYMPTOMS: Status: ACTIVE | Noted: 2020-06-15

## 2021-04-02 PROBLEM — T78.40XA ALLERGIC REACTION: Status: ACTIVE | Noted: 2019-09-10

## 2021-04-02 PROBLEM — I10 ESSENTIAL HYPERTENSION: Status: ACTIVE | Noted: 2018-09-13

## 2021-04-02 PROBLEM — K63.5 POLYP OF COLON: Status: ACTIVE | Noted: 2019-01-21

## 2021-04-02 PROBLEM — R42 VERTIGO: Status: ACTIVE | Noted: 2021-04-02

## 2021-04-02 PROBLEM — Z90.49 HISTORY OF CHOLECYSTECTOMY: Status: ACTIVE | Noted: 2018-12-03

## 2021-04-02 NOTE — TELEPHONE ENCOUNTER
----- Message from Ernestina Fermin sent at 1/19/2018  2:16 PM CST -----  Contact: Mom [phone]  Mom wants to know if you would be able to see the pt one more time before he turns 20 years old for his med check. Please call mom back to discuss this.   PATIENT CALLED IN STATING THE PHARMACY SAID SHE DOES NOT HAVE A RE-FILL FOR sertraline (ZOLOFT) 25 MG tablet PATIENT WOULD LIKE THE NURSE TO CHECK INTO TO THIS, SHE THOUGHT SHE HAD A RE-FILL.    PATIENT SAID SHE ONLY HAS ONE TABLET LEFT.    BEST CALL BACK # [phone]

## 2021-04-02 NOTE — TELEPHONE ENCOUNTER
Pt aware that PA has been started and could be 3-7 days before we hear back from them.  Pt was advised that the medication is $9 if she uses the [facility] [facility]

## 2021-04-08 ENCOUNTER — OFFICE VISIT (OUTPATIENT)
Dept: FAMILY MEDICINE CLINIC | Facility: CLINIC | Age: 76
End: 2021-04-08

## 2021-04-08 VITALS
SYSTOLIC BLOOD PRESSURE: 144 MMHG | DIASTOLIC BLOOD PRESSURE: 84 MMHG | HEIGHT: 65 IN | OXYGEN SATURATION: 97 % | TEMPERATURE: 96.8 F | BODY MASS INDEX: 33.55 KG/M2 | HEART RATE: 87 BPM | WEIGHT: 201.4 LBS

## 2021-04-08 DIAGNOSIS — F43.23 ADJUSTMENT DISORDER WITH MIXED ANXIETY AND DEPRESSED MOOD: ICD-10-CM

## 2021-04-08 DIAGNOSIS — E03.8 HYPOTHYROIDISM DUE TO HASHIMOTO'S THYROIDITIS: ICD-10-CM

## 2021-04-08 DIAGNOSIS — E06.3 HYPOTHYROIDISM DUE TO HASHIMOTO'S THYROIDITIS: ICD-10-CM

## 2021-04-08 DIAGNOSIS — I10 BENIGN ESSENTIAL HYPERTENSION: Primary | ICD-10-CM

## 2021-04-08 DIAGNOSIS — Z79.899 ENCOUNTER FOR LONG-TERM (CURRENT) USE OF MEDICATIONS: ICD-10-CM

## 2021-04-08 PROCEDURE — 99214 OFFICE O/P EST MOD 30 MIN: CPT | Performed by: FAMILY MEDICINE

## 2021-04-08 NOTE — PROGRESS NOTES
"Chief Complaint  Anxiety (recheck on sertraline), Depression, and Joint Pain (fingers, neck, back and right knee, and ankles)    Subjective          Mer Ramos presents to [facility]  Patient is here to follow up on her chronic medical problems:    Patient was diagnosed with hypertension in the year 2000.  She currently takes valsartan 160 mg daily.  She did forget to take her blood pressure medicine this morning and took it just right before she came in.  Her blood pressure was a little elevated today but her readings have been good at home.    Hashimoto's thyroiditis in 1977.  She then had a L subtotal thyroidectomy,  in 2005 and then the right side was removed in 2018.  She currently takes levothyroxine 112 mcg daily.    Depression/anxiety.  Patient is taking sertraline 25 mg every evening.  She states that she has not noticed any side effects.  She has actually noticed some improvement of her anxiety and depression already.  She thinks a increase at this time would be beneficial.      Objective   Vital Signs:   /84   Pulse 87   Temp 96.8 °F (36 °C)   Ht 165.1 cm (65\")   Wt 91.4 kg (201 lb 6.4 oz)   SpO2 97%   BMI 33.51 kg/m²     Physical Exam  Vitals and nursing note reviewed.   Constitutional:       Appearance: She is well-developed.   HENT:      Head: Normocephalic and atraumatic.   Eyes:      Conjunctiva/sclera: Conjunctivae normal.   Cardiovascular:      Rate and Rhythm: Normal rate and regular rhythm.      Heart sounds: Normal heart sounds.   Pulmonary:      Effort: Pulmonary effort is normal.      Breath sounds: Normal breath sounds.   Abdominal:      General: Bowel sounds are normal.      Palpations: Abdomen is soft.   Musculoskeletal:         General: Normal range of motion.      Cervical back: Normal range of motion and neck supple.   Skin:     General: Skin is warm and dry.      Capillary Refill: Capillary refill takes less than 2 seconds.      Findings: No " rash.   Neurological:      Mental Status: She is alert and oriented to person, place, and time.   Psychiatric:         Mood and Affect: Mood normal.         Behavior: Behavior normal.         Thought Content: Thought content normal.         Judgment: Judgment normal.        Result Review :   The following data was reviewed by: Jeannette Merritt DO on 04/08/2021:  Common labs    Common Labsle 10/20/20 12/3/20 1/15/21 1/15/21      1407 1407   Glucose    92   BUN    11   Creatinine    0.98   eGFR Non  Am    57 (A)   eGFR African Am    66   Sodium    139   Potassium    3.8   Chloride    101   Calcium    9.4   Total Protein    7.5   Albumin    4.5   Total Bilirubin    0.4   Alkaline Phosphatase    37 (A)   AST (SGOT)    21   ALT (SGPT)    12   WBC 4.73 4.86 5.2    Hemoglobin 11.2 (A) 11.4 (A) 12.1    Hematocrit 33.1 (A) 34.4 (A) 34.9    Platelets 162 162 178    (A) Abnormal value            TSH    TSH 1/15/21   TSH 4.120                     Assessment and Plan    Diagnoses and all orders for this visit:    1. Benign essential hypertension (Primary)  -     Comprehensive Metabolic Panel    2. Adjustment disorder with mixed anxiety and depressed mood  -     sertraline (ZOLOFT) 50 MG tablet; Take 1 tablet by mouth Every Evening.  Dispense: 90 tablet; Refill: 0    3. Hypothyroidism due to Hashimoto's thyroiditis  -     TSH    4. Encounter for long-term (current) use of medications  -     Lipid Panel  -     TSH  -     Comprehensive Metabolic Panel  -     CBC & Differential    Patient is here today for chronic stable hypothyroidism, hypertension, and progressing depression anxiety.  I will increase the sertraline to 50 mg daily.  I have instructed the patient that she may cut the tablet in half if she does not like the increase after a couple of weeks or she may continue at that dose.  Surveillance labs were obtained today and any medication changes will be made based on lab results and will be called to the patient later  this week.      Follow Up   Return in about 3 months (around 7/8/2021) for HTN, Thyroid, Depression.  Patient was given instructions and counseling regarding her condition or for health maintenance advice. Please see specific information pulled into the AVS if appropriate.

## 2021-04-09 ENCOUNTER — TELEPHONE (OUTPATIENT)
Dept: GASTROENTEROLOGY | Facility: CLINIC | Age: 76
End: 2021-04-09

## 2021-04-09 DIAGNOSIS — E03.8 HYPOTHYROIDISM DUE TO HASHIMOTO'S THYROIDITIS: ICD-10-CM

## 2021-04-09 DIAGNOSIS — E06.3 HYPOTHYROIDISM DUE TO HASHIMOTO'S THYROIDITIS: ICD-10-CM

## 2021-04-09 LAB
ALBUMIN SERPL-MCNC: 4.4 G/DL (ref 3.5–5.2)
ALBUMIN/GLOB SERPL: 1.7 G/DL
ALP SERPL-CCNC: 33 U/L (ref 39–117)
ALT SERPL-CCNC: 9 U/L (ref 1–33)
AST SERPL-CCNC: 16 U/L (ref 1–32)
BASOPHILS # BLD AUTO: 0.07 10*3/MM3 (ref 0–0.2)
BASOPHILS NFR BLD AUTO: 1.4 % (ref 0–1.5)
BILIRUB SERPL-MCNC: 0.4 MG/DL (ref 0–1.2)
BUN SERPL-MCNC: 15 MG/DL (ref 8–23)
BUN/CREAT SERPL: 15.2 (ref 7–25)
CALCIUM SERPL-MCNC: 9.4 MG/DL (ref 8.6–10.5)
CHLORIDE SERPL-SCNC: 102 MMOL/L (ref 98–107)
CHOLEST SERPL-MCNC: 201 MG/DL (ref 0–200)
CO2 SERPL-SCNC: 27.6 MMOL/L (ref 22–29)
CREAT SERPL-MCNC: 0.99 MG/DL (ref 0.57–1)
EOSINOPHIL # BLD AUTO: 0.16 10*3/MM3 (ref 0–0.4)
EOSINOPHIL NFR BLD AUTO: 3.2 % (ref 0.3–6.2)
ERYTHROCYTE [DISTWIDTH] IN BLOOD BY AUTOMATED COUNT: 13.8 % (ref 12.3–15.4)
GLOBULIN SER CALC-MCNC: 2.6 GM/DL
GLUCOSE SERPL-MCNC: 92 MG/DL (ref 65–99)
HCT VFR BLD AUTO: 38.2 % (ref 34–46.6)
HDLC SERPL-MCNC: 38 MG/DL (ref 40–60)
HGB BLD-MCNC: 12.8 G/DL (ref 12–15.9)
IMM GRANULOCYTES # BLD AUTO: 0.01 10*3/MM3 (ref 0–0.05)
IMM GRANULOCYTES NFR BLD AUTO: 0.2 % (ref 0–0.5)
LDLC SERPL CALC-MCNC: 127 MG/DL (ref 0–100)
LYMPHOCYTES # BLD AUTO: 1.48 10*3/MM3 (ref 0.7–3.1)
LYMPHOCYTES NFR BLD AUTO: 29.2 % (ref 19.6–45.3)
MCH RBC QN AUTO: 28.3 PG (ref 26.6–33)
MCHC RBC AUTO-ENTMCNC: 33.5 G/DL (ref 31.5–35.7)
MCV RBC AUTO: 84.5 FL (ref 79–97)
MONOCYTES # BLD AUTO: 0.38 10*3/MM3 (ref 0.1–0.9)
MONOCYTES NFR BLD AUTO: 7.5 % (ref 5–12)
NEUTROPHILS # BLD AUTO: 2.97 10*3/MM3 (ref 1.7–7)
NEUTROPHILS NFR BLD AUTO: 58.5 % (ref 42.7–76)
NRBC BLD AUTO-RTO: 0 /100 WBC (ref 0–0.2)
PLATELET # BLD AUTO: 174 10*3/MM3 (ref 140–450)
POTASSIUM SERPL-SCNC: 4.4 MMOL/L (ref 3.5–5.2)
PROT SERPL-MCNC: 7 G/DL (ref 6–8.5)
RBC # BLD AUTO: 4.52 10*6/MM3 (ref 3.77–5.28)
SODIUM SERPL-SCNC: 138 MMOL/L (ref 136–145)
TRIGL SERPL-MCNC: 202 MG/DL (ref 0–150)
TSH SERPL DL<=0.005 MIU/L-ACNC: 6.15 UIU/ML (ref 0.27–4.2)
VLDLC SERPL CALC-MCNC: 36 MG/DL (ref 5–40)
WBC # BLD AUTO: 5.07 10*3/MM3 (ref 3.4–10.8)

## 2021-04-09 RX ORDER — LEVOTHYROXINE SODIUM 0.12 MG/1
125 TABLET ORAL DAILY
Qty: 90 TABLET | Refills: 0 | Status: SHIPPED | OUTPATIENT
Start: 2021-04-09 | End: 2021-07-23 | Stop reason: SDUPTHER

## 2021-04-13 ENCOUNTER — PREP FOR SURGERY (OUTPATIENT)
Dept: OTHER | Facility: HOSPITAL | Age: 76
End: 2021-04-13

## 2021-04-13 DIAGNOSIS — Z86.010 PERSONAL HISTORY OF COLONIC POLYPS: ICD-10-CM

## 2021-04-13 DIAGNOSIS — Z12.11 ENCOUNTER FOR SCREENING FOR MALIGNANT NEOPLASM OF COLON: Primary | ICD-10-CM

## 2021-04-14 PROBLEM — Z12.11 ENCOUNTER FOR SCREENING FOR MALIGNANT NEOPLASM OF COLON: Status: ACTIVE | Noted: 2021-04-14

## 2021-04-14 PROBLEM — Z86.010 PERSONAL HISTORY OF COLONIC POLYPS: Status: ACTIVE | Noted: 2021-04-14

## 2021-04-14 NOTE — TELEPHONE ENCOUNTER
CALLED AND SPOKE WITH PATIENT.  SCHEDULED AT [facility] ON 09/29/2021 AT 8:45AM - ARRIVE 7:30AM.  WILL MAIL INSTRUCTIONS.    COVID TEST ON 09/27/2021, WILL CALL WITH TIME.  NEED TO SELF QUARANTINE UNTIL AFTER PROCEDURE.  SHE UNDERSTANDS.

## 2021-05-10 RX ORDER — VALSARTAN 160 MG/1
160 TABLET ORAL DAILY
Qty: 90 TABLET | Refills: 1 | Status: SHIPPED | OUTPATIENT
Start: 2021-05-10 | End: 2021-07-21 | Stop reason: SDUPTHER

## 2021-05-10 NOTE — TELEPHONE ENCOUNTER
Caller: Mer Ramos    Relationship: Self    Best call back number: [phone]    Medication needed:   Requested Prescriptions     Pending Prescriptions Disp Refills   • valsartan (DIOVAN) 160 MG tablet       Sig: Take 1 tablet by mouth Daily.       When do you need the refill by: 05/10    What additional details did the patient provide when requesting the medication: PATIENT HAS SEVEN PILLS LEFT.  [facility]  HAS A SEVEN TO TEN DAY LEAD TIME FOR DELIVERY    Does the patient have less than a 3 day supply:  [] Yes  [x] No    What is the patient's preferred pharmacy: [facility] - [facility] [address] - [phone]  - [phone] FX

## 2021-06-03 ENCOUNTER — OFFICE VISIT (OUTPATIENT)
Dept: FAMILY MEDICINE CLINIC | Facility: CLINIC | Age: 76
End: 2021-06-03

## 2021-06-03 VITALS
DIASTOLIC BLOOD PRESSURE: 84 MMHG | WEIGHT: 204.6 LBS | HEIGHT: 66 IN | OXYGEN SATURATION: 98 % | HEART RATE: 83 BPM | BODY MASS INDEX: 32.88 KG/M2 | TEMPERATURE: 97.3 F | SYSTOLIC BLOOD PRESSURE: 136 MMHG

## 2021-06-03 DIAGNOSIS — F43.23 ADJUSTMENT DISORDER WITH MIXED ANXIETY AND DEPRESSED MOOD: ICD-10-CM

## 2021-06-03 DIAGNOSIS — R82.998 LEUKOCYTES IN URINE: ICD-10-CM

## 2021-06-03 DIAGNOSIS — E03.8 HYPOTHYROIDISM DUE TO HASHIMOTO'S THYROIDITIS: ICD-10-CM

## 2021-06-03 DIAGNOSIS — E06.3 HYPOTHYROIDISM DUE TO HASHIMOTO'S THYROIDITIS: ICD-10-CM

## 2021-06-03 DIAGNOSIS — R39.9 UTI SYMPTOMS: Primary | ICD-10-CM

## 2021-06-03 DIAGNOSIS — Z88.1 ALLERGY TO MULTIPLE ANTIBIOTICS: ICD-10-CM

## 2021-06-03 LAB
BILIRUB BLD-MCNC: NEGATIVE MG/DL
CLARITY, POC: CLEAR
COLOR UR: YELLOW
GLUCOSE UR STRIP-MCNC: NEGATIVE MG/DL
KETONES UR QL: NEGATIVE
LEUKOCYTE EST, POC: ABNORMAL
NITRITE UR-MCNC: NEGATIVE MG/ML
PH UR: 7 [PH] (ref 5–8)
PROT UR STRIP-MCNC: NEGATIVE MG/DL
RBC # UR STRIP: NEGATIVE /UL
SP GR UR: 1.01 (ref 1–1.03)
TSH SERPL DL<=0.005 MIU/L-ACNC: 6.54 UIU/ML (ref 0.27–4.2)
UROBILINOGEN UR QL: NORMAL

## 2021-06-03 PROCEDURE — 81003 URINALYSIS AUTO W/O SCOPE: CPT | Performed by: FAMILY MEDICINE

## 2021-06-03 PROCEDURE — 99214 OFFICE O/P EST MOD 30 MIN: CPT | Performed by: FAMILY MEDICINE

## 2021-06-03 RX ORDER — NITROFURANTOIN 25; 75 MG/1; MG/1
100 CAPSULE ORAL 2 TIMES DAILY
Qty: 14 CAPSULE | Refills: 0 | Status: SHIPPED | OUTPATIENT
Start: 2021-06-03 | End: 2021-07-21

## 2021-06-03 NOTE — PROGRESS NOTES
"Chief Complaint  Dizziness, Generalized Body Aches, and Urinary Tract Infection    Subjective     {Problem List  Visit Diagnosis   Encounters  Notes  Medications  Labs  Result Review Imaging  Media :23}     Mer Ramos presents to [facility]  Patient is here today with a new problem.  She states that she has had episodes of dizziness and vertigo for years and has seen ENT in the past.  However, over the past week or so her vertigo has been worse.  She has also been feeling weak, achy joints, and having puffy eyes.  In addition she is also been having some lower pelvic pain and frequency of urine.  Patient feels she may have a urinary tract infection.    At her last visit in early April I increased the levothyroxine to 125 mcg from 112 mcg due to an elevated TSH.  The patient states that she was on that dosage for a couple of weeks when she started noticing chest pressure and flushing.  She decreased her dosage back to 112 mcg and the symptoms subsided.  She has continued at that dose since then.        Objective   Vital Signs:   /84   Pulse 83   Temp 97.3 °F (36.3 °C)   Ht 167 cm (65.75\")   Wt 92.8 kg (204 lb 9.6 oz)   SpO2 98%   BMI 33.27 kg/m²     Physical Exam  Vitals and nursing note reviewed.   Constitutional:       Appearance: Normal appearance. She is well-developed.   HENT:      Head: Normocephalic and atraumatic.   Eyes:      General: No scleral icterus.     Conjunctiva/sclera: Conjunctivae normal.   Cardiovascular:      Rate and Rhythm: Normal rate and regular rhythm.      Heart sounds: Normal heart sounds.   Pulmonary:      Effort: Pulmonary effort is normal.      Breath sounds: Normal breath sounds.   Abdominal:      General: Bowel sounds are normal.      Palpations: Abdomen is soft.   Musculoskeletal:         General: Normal range of motion.      Cervical back: Normal range of motion and neck supple.      Right lower leg: No edema.      Left lower " leg: No edema.   Skin:     General: Skin is warm and dry.      Capillary Refill: Capillary refill takes less than 2 seconds.      Findings: No rash.   Neurological:      Mental Status: She is alert and oriented to person, place, and time.   Psychiatric:         Mood and Affect: Mood normal.         Behavior: Behavior normal.         Thought Content: Thought content normal.         Judgment: Judgment normal.        Result Review :   The following data was reviewed by: Jeannette Merritt DO on 06/03/2021:  Common labs    Common Labsle 1/15/21 1/15/21 4/8/21 4/8/21 4/8/21 4/20/21    1407 1407 1009 1009 1009    Glucose  92  92     BUN  11  15     Creatinine  0.98  0.99     eGFR Non African Am  57 (A)  55 (A)     eGFR African Am  66  66     Sodium  139  138     Potassium  3.8  4.4     Chloride  101  102     Calcium  9.4  9.4     Total Protein  7.5  7.0     Albumin  4.5  4.40     Total Bilirubin  0.4  0.4     Alkaline Phosphatase  37 (A)  33 (A)     AST (SGOT)  21  16     ALT (SGPT)  12  9     WBC 5.2    5.07 5.82   Hemoglobin 12.1    12.8 12.1   Hematocrit 34.9    38.2 35.8 (A)   Platelets 178    174 160   Total Cholesterol   201 (A)      Triglycerides   202 (A)      HDL Cholesterol   38 (A)      LDL Cholesterol    127 (A)      (A) Abnormal value       Comments are available for some flowsheets but are not being displayed.           TSH    TSH 1/15/21 4/8/21   TSH 4.120 6.150 (A)   (A) Abnormal value                      Assessment and Plan    Diagnoses and all orders for this visit:    1. UTI symptoms (Primary)    2. Leukocytes in urine  -     POC Urinalysis Dipstick, Automated  -     Urine Culture - Urine, Urine, Clean Catch  -     nitrofurantoin, macrocrystal-monohydrate, (Macrobid) 100 MG capsule; Take 1 capsule by mouth 2 (Two) Times a Day.  Dispense: 14 capsule; Refill: 0    3. Adjustment disorder with mixed anxiety and depressed mood    4. Hypothyroidism due to Hashimoto's thyroiditis  -     TSH    5. Allergy to  multiple antibiotics  -     nitrofurantoin, macrocrystal-monohydrate, (Macrobid) 100 MG capsule; Take 1 capsule by mouth 2 (Two) Times a Day.  Dispense: 14 capsule; Refill: 0    Based on the patient's symptoms and the fact that she has leukocytes in her urine I will go ahead and treat her for a urinary tract infection.  Culture sent.  The only option that she is not allergic to is Macrobid.  The patient states that even though she has been allergic to Ceclor in the past (rash) she has never had any problems with Keflex.  If I need to change antibiotics that is what I would change to.  I will also recheck the TSH to see how her thyroid is doing and we will adjust the dosage based on the results.      Follow Up   Return in about 3 months (around 9/3/2021) for Thyroid.  Patient was given instructions and counseling regarding her condition or for health maintenance advice. Please see specific information pulled into the AVS if appropriate.

## 2021-06-05 LAB
BACTERIA UR CULT: NORMAL
BACTERIA UR CULT: NORMAL

## 2021-07-14 ENCOUNTER — TELEPHONE (OUTPATIENT)
Dept: GASTROENTEROLOGY | Facility: CLINIC | Age: 76
End: 2021-07-14

## 2021-07-14 NOTE — TELEPHONE ENCOUNTER
CALLED AND SPOKE WITH PATIENT.  SCHEDULED FOR COLONOSCOPY ON 09/29/2021.  NEED TO RESCHEDULE.  DR. MICHAELS WILL BE OUT OF TOWN.    SCHEDULED TO [facility] ON 10/09/2021 AT 12:30PM - ARRIVE 11AM.  WILL MAIL NEW INSTRUCTIONS.

## 2021-07-21 ENCOUNTER — OFFICE VISIT (OUTPATIENT)
Dept: FAMILY MEDICINE CLINIC | Facility: CLINIC | Age: 76
End: 2021-07-21

## 2021-07-21 VITALS
HEART RATE: 63 BPM | DIASTOLIC BLOOD PRESSURE: 66 MMHG | BODY MASS INDEX: 34.05 KG/M2 | OXYGEN SATURATION: 100 % | SYSTOLIC BLOOD PRESSURE: 122 MMHG | WEIGHT: 204.4 LBS | HEIGHT: 65 IN | TEMPERATURE: 97.8 F

## 2021-07-21 DIAGNOSIS — I10 BENIGN ESSENTIAL HYPERTENSION: ICD-10-CM

## 2021-07-21 DIAGNOSIS — Z79.899 ENCOUNTER FOR LONG-TERM (CURRENT) USE OF MEDICATIONS: ICD-10-CM

## 2021-07-21 DIAGNOSIS — E06.3 HYPOTHYROIDISM DUE TO HASHIMOTO'S THYROIDITIS: Primary | ICD-10-CM

## 2021-07-21 DIAGNOSIS — E03.8 HYPOTHYROIDISM DUE TO HASHIMOTO'S THYROIDITIS: Primary | ICD-10-CM

## 2021-07-21 PROCEDURE — 99214 OFFICE O/P EST MOD 30 MIN: CPT | Performed by: FAMILY MEDICINE

## 2021-07-21 RX ORDER — VALSARTAN 160 MG/1
160 TABLET ORAL DAILY
Qty: 90 TABLET | Refills: 1 | Status: SHIPPED | OUTPATIENT
Start: 2021-07-21 | End: 2021-07-21

## 2021-07-21 RX ORDER — VALSARTAN 160 MG/1
160 TABLET ORAL DAILY
Qty: 90 TABLET | Refills: 1 | Status: SHIPPED | OUTPATIENT
Start: 2021-07-21 | End: 2022-02-23 | Stop reason: DRUGHIGH

## 2021-07-21 NOTE — PROGRESS NOTES
"Chief Complaint  Hypothyroidism, Hypertension, and Depression    Subjective     {Problem List  Visit Diagnosis   Encounters  Notes  Medications  Labs  Result Review Imaging  Media :23}     Mer Ramos presents to [facility]  Patient is here to follow up on her chronic medical problems:    Patient was diagnosed with hypertension in the year 2000.  She currently takes valsartan 160 mg daily.  Blood pressures are well controlled.    Hashimoto's thyroiditis in 1977.  She then had a L subtotal thyroidectomy,  in 2005 and then the right side was removed in 2018.  She currently takes levothyroxine 125 mcg daily.  Thyroid is stable      Objective   Vital Signs:   /66   Pulse 63   Temp 97.8 °F (36.6 °C)   Ht 165.1 cm (65\")   Wt 92.7 kg (204 lb 6.4 oz)   SpO2 100%   BMI 34.01 kg/m²     Physical Exam  Vitals and nursing note reviewed.   Constitutional:       Appearance: Normal appearance. She is well-developed. She is obese.   HENT:      Head: Normocephalic and atraumatic.   Eyes:      General: No scleral icterus.     Conjunctiva/sclera: Conjunctivae normal.   Cardiovascular:      Rate and Rhythm: Normal rate and regular rhythm.      Heart sounds: Normal heart sounds.   Pulmonary:      Effort: Pulmonary effort is normal.      Breath sounds: Normal breath sounds.   Abdominal:      General: Bowel sounds are normal.      Palpations: Abdomen is soft.   Musculoskeletal:         General: Normal range of motion.      Cervical back: Normal range of motion and neck supple.      Right lower leg: No edema.      Left lower leg: No edema.   Skin:     General: Skin is warm and dry.      Capillary Refill: Capillary refill takes less than 2 seconds.      Findings: No rash.   Neurological:      Mental Status: She is alert and oriented to person, place, and time.   Psychiatric:         Mood and Affect: Mood normal.         Behavior: Behavior normal.         Thought Content: Thought content " normal.         Judgment: Judgment normal.        Result Review :   The following data was reviewed by: Jeannette Merritt DO on 07/21/2021:  Common labs    Common Labsle 1/15/21 1/15/21 4/8/21 4/8/21 4/8/21 4/20/21    1407 1407 1009 1009 1009    Glucose  92  92     BUN  11  15     Creatinine  0.98  0.99     eGFR Non African Am  57 (A)  55 (A)     eGFR African Am  66  66     Sodium  139  138     Potassium  3.8  4.4     Chloride  101  102     Calcium  9.4  9.4     Total Protein  7.5  7.0     Albumin  4.5  4.40     Total Bilirubin  0.4  0.4     Alkaline Phosphatase  37 (A)  33 (A)     AST (SGOT)  21  16     ALT (SGPT)  12  9     WBC 5.2    5.07 5.82   Hemoglobin 12.1    12.8 12.1   Hematocrit 34.9    38.2 35.8 (A)   Platelets 178    174 160   Total Cholesterol   201 (A)      Triglycerides   202 (A)      HDL Cholesterol   38 (A)      LDL Cholesterol    127 (A)      (A) Abnormal value       Comments are available for some flowsheets but are not being displayed.           TSH    TSH 1/15/21 4/8/21 6/3/21   TSH 4.120 6.150 (A) 6.540 (A)   (A) Abnormal value                      Assessment and Plan    Diagnoses and all orders for this visit:    1. 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 Benign essential hypertension  -     Comprehensive Metabolic Panel  -     Discontinue: valsartan (DIOVAN) 160 MG tablet; Take 1 tablet by mouth Daily.  Dispense: 90 tablet; Refill: 1  -     valsartan (DIOVAN) 160 MG tablet; Take 1 tablet by mouth Daily.  Dispense: 90 tablet; Refill: 1    3. Encounter for long-term (current) use of medications  -     CBC & Differential  -     Comprehensive Metabolic Panel  -     TSH    Patient is here for chronic stable hypothyroidism and hypertension.  Surveillance labs were obtained today and any medication changes will be made based on lab results and will be called to the patient later this week.      Follow Up   Return in about 6 months (around 1/21/2022) for HTN,  Thyroid.  Patient was given instructions and counseling regarding her condition or for health maintenance advice. Please see specific information pulled into the AVS if appropriate.

## 2021-07-22 LAB
ALBUMIN SERPL-MCNC: 4.3 G/DL (ref 3.5–5.2)
ALBUMIN/GLOB SERPL: 1.5 G/DL
ALP SERPL-CCNC: 34 U/L (ref 39–117)
ALT SERPL-CCNC: 12 U/L (ref 1–33)
AST SERPL-CCNC: 20 U/L (ref 1–32)
BASOPHILS # BLD AUTO: 0.05 10*3/MM3 (ref 0–0.2)
BASOPHILS NFR BLD AUTO: 1.1 % (ref 0–1.5)
BILIRUB SERPL-MCNC: 0.4 MG/DL (ref 0–1.2)
BUN SERPL-MCNC: 11 MG/DL (ref 8–23)
BUN/CREAT SERPL: 11.3 (ref 7–25)
CALCIUM SERPL-MCNC: 9.5 MG/DL (ref 8.6–10.5)
CHLORIDE SERPL-SCNC: 105 MMOL/L (ref 98–107)
CO2 SERPL-SCNC: 27.7 MMOL/L (ref 22–29)
CREAT SERPL-MCNC: 0.97 MG/DL (ref 0.57–1)
EOSINOPHIL # BLD AUTO: 0.2 10*3/MM3 (ref 0–0.4)
EOSINOPHIL NFR BLD AUTO: 4.3 % (ref 0.3–6.2)
ERYTHROCYTE [DISTWIDTH] IN BLOOD BY AUTOMATED COUNT: 14.2 % (ref 12.3–15.4)
GLOBULIN SER CALC-MCNC: 2.9 GM/DL
GLUCOSE SERPL-MCNC: 96 MG/DL (ref 65–99)
HCT VFR BLD AUTO: 38 % (ref 34–46.6)
HGB BLD-MCNC: 12.5 G/DL (ref 12–15.9)
IMM GRANULOCYTES # BLD AUTO: 0.02 10*3/MM3 (ref 0–0.05)
IMM GRANULOCYTES NFR BLD AUTO: 0.4 % (ref 0–0.5)
LYMPHOCYTES # BLD AUTO: 1.51 10*3/MM3 (ref 0.7–3.1)
LYMPHOCYTES NFR BLD AUTO: 32.8 % (ref 19.6–45.3)
MCH RBC QN AUTO: 28 PG (ref 26.6–33)
MCHC RBC AUTO-ENTMCNC: 32.9 G/DL (ref 31.5–35.7)
MCV RBC AUTO: 85.2 FL (ref 79–97)
MONOCYTES # BLD AUTO: 0.42 10*3/MM3 (ref 0.1–0.9)
MONOCYTES NFR BLD AUTO: 9.1 % (ref 5–12)
NEUTROPHILS # BLD AUTO: 2.4 10*3/MM3 (ref 1.7–7)
NEUTROPHILS NFR BLD AUTO: 52.3 % (ref 42.7–76)
NRBC BLD AUTO-RTO: 0 /100 WBC (ref 0–0.2)
PLATELET # BLD AUTO: 170 10*3/MM3 (ref 140–450)
POTASSIUM SERPL-SCNC: 4.4 MMOL/L (ref 3.5–5.2)
PROT SERPL-MCNC: 7.2 G/DL (ref 6–8.5)
RBC # BLD AUTO: 4.46 10*6/MM3 (ref 3.77–5.28)
SODIUM SERPL-SCNC: 141 MMOL/L (ref 136–145)
TSH SERPL DL<=0.005 MIU/L-ACNC: 1.6 UIU/ML (ref 0.27–4.2)
WBC # BLD AUTO: 4.6 10*3/MM3 (ref 3.4–10.8)

## 2021-07-23 DIAGNOSIS — E06.3 HYPOTHYROIDISM DUE TO HASHIMOTO'S THYROIDITIS: ICD-10-CM

## 2021-07-23 DIAGNOSIS — E03.8 HYPOTHYROIDISM DUE TO HASHIMOTO'S THYROIDITIS: ICD-10-CM

## 2021-07-23 RX ORDER — LEVOTHYROXINE SODIUM 0.12 MG/1
125 TABLET ORAL DAILY
Qty: 90 TABLET | Refills: 1 | Status: SHIPPED | OUTPATIENT
Start: 2021-07-23 | End: 2022-02-08

## 2021-09-07 ENCOUNTER — TELEPHONE (OUTPATIENT)
Dept: GASTROENTEROLOGY | Facility: CLINIC | Age: 76
End: 2021-09-07

## 2021-09-07 NOTE — TELEPHONE ENCOUNTER
PATIENT CALLED AND LEFT MESSAGE ON BY VOICE MAIL.  NEEDING TO RESCHEDULE HER COLONOSCOPY ON 10/09/2021.  TOO MUCH COVID GOING ON.    [facility] ARRIVE 8:30AM.  WILL MAIL INSTRUCTIONS.

## 2021-10-19 ENCOUNTER — TELEPHONE (OUTPATIENT)
Dept: FAMILY MEDICINE CLINIC | Facility: CLINIC | Age: 76
End: 2021-10-19

## 2021-10-19 NOTE — TELEPHONE ENCOUNTER
Caller: Mer Ramos    Relationship to patient: Self    Best call back number: [phone]    Chief complaint: RIGHT EAR PAIN, BLADDER PAIN AND FREQUENT URINATION    Type of visit: OFFICE    Requested date: 10/20    Additional notes: PATIENT CAN NOT COME IN TODAY FOR A SAME DAY APPT DUE TO NO TRANSPORTATION, PLEASE CALL TO SCHEDULE FOR 10/20. ATTEMPTED WARM TRANSFER, NO ANSWER.

## 2021-10-20 ENCOUNTER — OFFICE VISIT (OUTPATIENT)
Dept: FAMILY MEDICINE CLINIC | Facility: CLINIC | Age: 76
End: 2021-10-20

## 2021-10-20 VITALS
DIASTOLIC BLOOD PRESSURE: 84 MMHG | TEMPERATURE: 97.8 F | HEART RATE: 88 BPM | BODY MASS INDEX: 35.1 KG/M2 | HEIGHT: 64 IN | WEIGHT: 205.6 LBS | OXYGEN SATURATION: 95 % | SYSTOLIC BLOOD PRESSURE: 152 MMHG

## 2021-10-20 DIAGNOSIS — R39.9 UTI SYMPTOMS: Primary | ICD-10-CM

## 2021-10-20 DIAGNOSIS — H60.501 ACUTE OTITIS EXTERNA OF RIGHT EAR, UNSPECIFIED TYPE: ICD-10-CM

## 2021-10-20 LAB
BILIRUB BLD-MCNC: NEGATIVE MG/DL
CLARITY, POC: CLEAR
COLOR UR: YELLOW
EXPIRATION DATE: ABNORMAL
GLUCOSE UR STRIP-MCNC: NEGATIVE MG/DL
KETONES UR QL: NEGATIVE
LEUKOCYTE EST, POC: ABNORMAL
Lab: ABNORMAL
NITRITE UR-MCNC: NEGATIVE MG/ML
PH UR: 6 [PH] (ref 5–8)
PROT UR STRIP-MCNC: NEGATIVE MG/DL
RBC # UR STRIP: NEGATIVE /UL
SP GR UR: 1.01 (ref 1–1.03)
UROBILINOGEN UR QL: NORMAL

## 2021-10-20 PROCEDURE — 81003 URINALYSIS AUTO W/O SCOPE: CPT | Performed by: FAMILY MEDICINE

## 2021-10-20 PROCEDURE — 99213 OFFICE O/P EST LOW 20 MIN: CPT | Performed by: FAMILY MEDICINE

## 2021-10-20 RX ORDER — NEOMYCIN SULFATE, POLYMYXIN B SULFATE AND HYDROCORTISONE 10; 3.5; 1 MG/ML; MG/ML; [USP'U]/ML
3 SUSPENSION/ DROPS AURICULAR (OTIC) 4 TIMES DAILY
Qty: 10 ML | Refills: 0 | Status: ON HOLD | OUTPATIENT
Start: 2021-10-20 | End: 2022-01-28

## 2021-10-20 NOTE — PROGRESS NOTES
"Chief Complaint  Urinary Tract Infection (recheck) and Earache (right)    Subjective          Mer Ramos presents to [facility]  Patient is here today with a new problem.  She started noticing urinary frequency and bladder spasms yesterday.  She states that her last urinary tract infection was a month ago and she was treated with Macrobid successfully.  Her symptoms went fully away after taking the Macrobid.  She has done well until yesterday.  Patient denies any back pain or fevers.  No dysuria.      Patient is also here for another acute new problem. She states that her right ear has been painful to touch for the past few days. She denies any hearing issue. She denies ringing in her years. Patient denies dizziness. Patient denies other upper respiratory symptoms.      Objective   Vital Signs:   /84   Pulse 88   Temp 97.8 °F (36.6 °C)   Ht 162.6 cm (64\")   Wt 93.3 kg (205 lb 9.6 oz)   SpO2 95%   BMI 35.29 kg/m²     Physical Exam  Vitals and nursing note reviewed.   Constitutional:       Appearance: She is well-developed.   HENT:      Head: Normocephalic and atraumatic.      Right Ear: Tympanic membrane and external ear normal. Tenderness present.      Left Ear: Tympanic membrane, ear canal and external ear normal.      Ears:      Comments: Mild Erythema R EAC  Eyes:      General: No scleral icterus.     Conjunctiva/sclera: Conjunctivae normal.   Pulmonary:      Effort: Pulmonary effort is normal.   Abdominal:      General: Bowel sounds are normal.      Palpations: Abdomen is soft.      Tenderness: There is no right CVA tenderness or left CVA tenderness.   Musculoskeletal:         General: Normal range of motion.      Cervical back: Normal range of motion and neck supple.   Skin:     General: Skin is warm and dry.      Capillary Refill: Capillary refill takes less than 2 seconds.      Findings: No rash.   Neurological:      Mental Status: She is alert and oriented to " person, place, and time.   Psychiatric:         Mood and Affect: Mood normal.         Behavior: Behavior normal.         Thought Content: Thought content normal.         Judgment: Judgment normal.        Result Review :   The following data was reviewed by: Jeannette Merritt DO on 10/20/2021:  UA    Urinalysis 6/3/21 9/22/21 10/20/21   Ketones, UA Negative Negative Negative   Leukocytes, UA Large (3+) (A) Small (1+) (A) Small (1+) (A)   (A) Abnormal value            Urine Culture    Urine Culture 1/15/21 6/3/21 9/22/21   Urine Culture Final report Final report Final report                     Assessment and Plan    Diagnoses and all orders for this visit:    1. UTI symptoms (Primary)  -     POCT urinalysis dipstick, automated    2. Acute otitis externa of right ear, unspecified type  -     neomycin-polymyxin-hydrocortisone (CORTISPORIN) 3.5-91232-0 otic suspension; Administer 3 drops to the right ear 4 (Four) Times a Day.  Dispense: 10 mL; Refill: 0    Patient is here today for 2 new problems.    Dysuria, I would like to wait on the urine culture results before treating since the patient's symptoms are not bad at this time.  However, I have asked the patient to contact me if her symptoms are worsening.    Right ear pain (otitis externa) I have sent in a prescription for Cortisporin otic.  If her pain is not improving she should follow-up.      Follow Up   Return if symptoms worsen or fail to improve.  Patient was given instructions and counseling regarding her condition or for health maintenance advice. Please see specific information pulled into the AVS if appropriate.

## 2021-10-26 ENCOUNTER — TELEPHONE (OUTPATIENT)
Dept: FAMILY MEDICINE CLINIC | Facility: CLINIC | Age: 76
End: 2021-10-26

## 2021-10-26 NOTE — TELEPHONE ENCOUNTER
Caller: Mer Ramos    Relationship: Self    Best call back number: [phone]     Caller requesting test results: PATIENT     What test was performed: URINE CULTURE     When was the test performed: 10/20/2021     Where was the test performed: OFFICE     Additional notes: PATIENT REQUESTING RESULTS

## 2022-01-24 DIAGNOSIS — Z01.818 OTHER SPECIFIED PRE-OPERATIVE EXAMINATION: Primary | ICD-10-CM

## 2022-01-26 ENCOUNTER — LAB (OUTPATIENT)
Dept: LAB | Facility: HOSPITAL | Age: 77
End: 2022-01-26

## 2022-01-26 DIAGNOSIS — Z01.818 OTHER SPECIFIED PRE-OPERATIVE EXAMINATION: ICD-10-CM

## 2022-01-26 LAB — SARS-COV-2 RNA PNL SPEC NAA+PROBE: NOT DETECTED

## 2022-01-26 PROCEDURE — 87635 SARS-COV-2 COVID-19 AMP PRB: CPT | Performed by: INTERNAL MEDICINE

## 2022-01-26 PROCEDURE — C9803 HOPD COVID-19 SPEC COLLECT: HCPCS

## 2022-01-27 ENCOUNTER — ANESTHESIA EVENT (OUTPATIENT)
Dept: PERIOP | Facility: HOSPITAL | Age: 77
End: 2022-01-27

## 2022-01-28 ENCOUNTER — HOSPITAL ENCOUNTER (OUTPATIENT)
Facility: HOSPITAL | Age: 77
Setting detail: HOSPITAL OUTPATIENT SURGERY
Discharge: HOME | End: 2022-01-28
Attending: INTERNAL MEDICINE | Admitting: INTERNAL MEDICINE

## 2022-01-28 ENCOUNTER — ANESTHESIA (OUTPATIENT)
Dept: PERIOP | Facility: HOSPITAL | Age: 77
End: 2022-01-28

## 2022-01-28 VITALS
DIASTOLIC BLOOD PRESSURE: 80 MMHG | RESPIRATION RATE: 12 BRPM | BODY MASS INDEX: 34.83 KG/M2 | SYSTOLIC BLOOD PRESSURE: 128 MMHG | TEMPERATURE: 98.6 F | OXYGEN SATURATION: 97 % | HEIGHT: 64 IN | HEART RATE: 67 BPM | WEIGHT: 204 LBS

## 2022-01-28 DIAGNOSIS — Z12.11 ENCOUNTER FOR SCREENING FOR MALIGNANT NEOPLASM OF COLON: ICD-10-CM

## 2022-01-28 DIAGNOSIS — Z86.010 PERSONAL HISTORY OF COLONIC POLYPS: ICD-10-CM

## 2022-01-28 PROCEDURE — 88305 TISSUE EXAM BY PATHOLOGIST: CPT | Performed by: INTERNAL MEDICINE

## 2022-01-28 PROCEDURE — 0 LIDOCAINE 1 % SOLUTION: Performed by: REGISTERED NURSE

## 2022-01-28 PROCEDURE — 25010000002 PROPOFOL 10 MG/ML EMULSION: Performed by: REGISTERED NURSE

## 2022-01-28 PROCEDURE — 45380 COLONOSCOPY AND BIOPSY: CPT | Performed by: INTERNAL MEDICINE

## 2022-01-28 RX ORDER — LIDOCAINE HYDROCHLORIDE 10 MG/ML
INJECTION, SOLUTION INFILTRATION; PERINEURAL AS NEEDED
Status: DISCONTINUED | OUTPATIENT
Start: 2022-01-28 | End: 2022-01-28 | Stop reason: SURG

## 2022-01-28 RX ORDER — LIDOCAINE HYDROCHLORIDE 10 MG/ML
0.5 INJECTION, SOLUTION EPIDURAL; INFILTRATION; INTRACAUDAL; PERINEURAL ONCE AS NEEDED
Status: DISCONTINUED | OUTPATIENT
Start: 2022-01-28 | End: 2022-01-28 | Stop reason: HOSPADM

## 2022-01-28 RX ORDER — SODIUM CHLORIDE 0.9 % (FLUSH) 0.9 %
10 SYRINGE (ML) INJECTION AS NEEDED
Status: DISCONTINUED | OUTPATIENT
Start: 2022-01-28 | End: 2022-01-28 | Stop reason: HOSPADM

## 2022-01-28 RX ORDER — SODIUM CHLORIDE, SODIUM LACTATE, POTASSIUM CHLORIDE, CALCIUM CHLORIDE 600; 310; 30; 20 MG/100ML; MG/100ML; MG/100ML; MG/100ML
100 INJECTION, SOLUTION INTRAVENOUS CONTINUOUS
Status: DISCONTINUED | OUTPATIENT
Start: 2022-01-28 | End: 2022-01-28 | Stop reason: HOSPADM

## 2022-01-28 RX ORDER — ONDANSETRON 2 MG/ML
4 INJECTION INTRAMUSCULAR; INTRAVENOUS ONCE AS NEEDED
Status: DISCONTINUED | OUTPATIENT
Start: 2022-01-28 | End: 2022-01-28 | Stop reason: HOSPADM

## 2022-01-28 RX ORDER — SODIUM CHLORIDE, SODIUM LACTATE, POTASSIUM CHLORIDE, CALCIUM CHLORIDE 600; 310; 30; 20 MG/100ML; MG/100ML; MG/100ML; MG/100ML
9 INJECTION, SOLUTION INTRAVENOUS CONTINUOUS
Status: DISCONTINUED | OUTPATIENT
Start: 2022-01-28 | End: 2022-01-28 | Stop reason: HOSPADM

## 2022-01-28 RX ORDER — SODIUM CHLORIDE 9 MG/ML
40 INJECTION, SOLUTION INTRAVENOUS AS NEEDED
Status: DISCONTINUED | OUTPATIENT
Start: 2022-01-28 | End: 2022-01-28 | Stop reason: HOSPADM

## 2022-01-28 RX ORDER — PROPOFOL 10 MG/ML
VIAL (ML) INTRAVENOUS AS NEEDED
Status: DISCONTINUED | OUTPATIENT
Start: 2022-01-28 | End: 2022-01-28 | Stop reason: SURG

## 2022-01-28 RX ORDER — SODIUM CHLORIDE 0.9 % (FLUSH) 0.9 %
10 SYRINGE (ML) INJECTION EVERY 12 HOURS SCHEDULED
Status: DISCONTINUED | OUTPATIENT
Start: 2022-01-28 | End: 2022-01-28 | Stop reason: HOSPADM

## 2022-01-28 RX ADMIN — PROPOFOL 50 MG: 10 INJECTION, EMULSION INTRAVENOUS at 09:23

## 2022-01-28 RX ADMIN — LIDOCAINE HYDROCHLORIDE 50 MG: 10 INJECTION, SOLUTION INFILTRATION; PERINEURAL at 09:27

## 2022-01-28 RX ADMIN — SODIUM CHLORIDE, POTASSIUM CHLORIDE, SODIUM LACTATE AND CALCIUM CHLORIDE 9 ML/HR: 600; 310; 30; 20 INJECTION, SOLUTION INTRAVENOUS at 08:40

## 2022-01-28 RX ADMIN — PROPOFOL 75 MCG/KG/MIN: 10 INJECTION, EMULSION INTRAVENOUS at 09:24

## 2022-01-28 NOTE — ANESTHESIA POSTPROCEDURE EVALUATION
Patient: Mer Ramos    Procedure Summary     Date: 01/28/22 Room / Location: [facility] ENDOSCOPY 1 /  [facility] OR    Anesthesia Start: 0921 Anesthesia Stop: 0957    Procedure: COLONOSCOPY WITH POLYPECTOMY (N/A ) Diagnosis:       Encounter for screening for malignant neoplasm of colon      Personal history of colonic polyps      Diverticulosis      Colon polyp      (Encounter for screening for malignant neoplasm of colon [Z12.11])      (Personal history of colonic polyps [Z86.010])    Surgeons: Aleksandr Denton MD Provider: Juan Pablo King CRNA    Anesthesia Type: Not recorded ASA Status: 3          Anesthesia Type: No value filed.    Vitals  Vitals Value Taken Time   /80 01/28/22 1010   Temp     Pulse 67 01/28/22 1026   Resp 12 01/28/22 1026   SpO2 97 % 01/28/22 1026           Post Anesthesia Care and Evaluation    Patient location during evaluation: bedside  Patient participation: complete - patient participated  Level of consciousness: awake and alert  Pain score: 0  Pain management: adequate  Airway patency: patent  Anesthetic complications: No anesthetic complications  PONV Status: none  Cardiovascular status: acceptable  Respiratory status: acceptable  Hydration status: acceptable

## 2022-01-28 NOTE — ANESTHESIA PREPROCEDURE EVALUATION
Anesthesia Evaluation     Patient summary reviewed and Nursing notes reviewed   no history of anesthetic complications:               Airway   Mallampati: II  TM distance: <3 FB  Neck ROM: full  No difficulty expected  Dental      Pulmonary - normal exam   (-) shortness of breath, sleep apnea  Cardiovascular - normal exam  Exercise tolerance: good (4-7 METS)    ECG reviewed    (+) hypertension well controlled less than 2 medications, hyperlipidemia,   (-) angina      Neuro/Psych  (+) psychiatric history Anxiety and Depression,     GI/Hepatic/Renal/Endo    (+)  GERD well controlled,  thyroid problem hypothyroidism    Musculoskeletal     Abdominal   (+) obese,    Substance History   (+) alcohol use,      OB/GYN negative ob/gyn ROS         Other   arthritis,    history of cancer                    Anesthesia Plan    ASA 3       total IV anesthesia  intravenous induction     Anesthetic plan, all risks, benefits, and alternatives have been provided, discussed and informed consent has been obtained with: patient.  Use of blood products discussed with patient  Consented to blood products.       CODE STATUS:

## 2022-01-31 LAB
LAB AP CASE REPORT: NORMAL
PATH REPORT.FINAL DX SPEC: NORMAL
PATH REPORT.GROSS SPEC: NORMAL

## 2022-02-01 DIAGNOSIS — K21.00 GASTROESOPHAGEAL REFLUX DISEASE WITH ESOPHAGITIS WITHOUT HEMORRHAGE: Primary | ICD-10-CM

## 2022-02-01 RX ORDER — OMEPRAZOLE 20 MG/1
20 CAPSULE, DELAYED RELEASE ORAL DAILY
Qty: 30 CAPSULE | Refills: 11 | Status: SHIPPED | OUTPATIENT
Start: 2022-02-01 | End: 2022-08-22

## 2022-02-01 RX ORDER — OMEPRAZOLE 40 MG/1
40 CAPSULE, DELAYED RELEASE ORAL DAILY
Qty: 90 CAPSULE | Refills: 3 | Status: SHIPPED | OUTPATIENT
Start: 2022-02-01 | End: 2022-02-23

## 2022-02-08 DIAGNOSIS — E03.8 HYPOTHYROIDISM DUE TO HASHIMOTO'S THYROIDITIS: ICD-10-CM

## 2022-02-08 DIAGNOSIS — E06.3 HYPOTHYROIDISM DUE TO HASHIMOTO'S THYROIDITIS: ICD-10-CM

## 2022-02-08 RX ORDER — LEVOTHYROXINE SODIUM 125 UG/1
TABLET ORAL
Qty: 90 TABLET | Refills: 0 | Status: SHIPPED | OUTPATIENT
Start: 2022-02-08 | End: 2022-02-25 | Stop reason: SDUPTHER

## 2022-02-23 ENCOUNTER — OFFICE VISIT (OUTPATIENT)
Dept: FAMILY MEDICINE CLINIC | Facility: CLINIC | Age: 77
End: 2022-02-23

## 2022-02-23 VITALS
TEMPERATURE: 97.5 F | BODY MASS INDEX: 34.39 KG/M2 | WEIGHT: 206.4 LBS | HEART RATE: 91 BPM | SYSTOLIC BLOOD PRESSURE: 160 MMHG | OXYGEN SATURATION: 98 % | HEIGHT: 65 IN | DIASTOLIC BLOOD PRESSURE: 90 MMHG

## 2022-02-23 DIAGNOSIS — E03.8 HYPOTHYROIDISM DUE TO HASHIMOTO'S THYROIDITIS: Primary | ICD-10-CM

## 2022-02-23 DIAGNOSIS — K21.9 GASTROESOPHAGEAL REFLUX DISEASE WITHOUT ESOPHAGITIS: ICD-10-CM

## 2022-02-23 DIAGNOSIS — Z79.899 ENCOUNTER FOR LONG-TERM (CURRENT) USE OF MEDICATIONS: ICD-10-CM

## 2022-02-23 DIAGNOSIS — E06.3 HYPOTHYROIDISM DUE TO HASHIMOTO'S THYROIDITIS: Primary | ICD-10-CM

## 2022-02-23 DIAGNOSIS — Z78.9 STATIN INTOLERANCE: ICD-10-CM

## 2022-02-23 DIAGNOSIS — I10 BENIGN ESSENTIAL HYPERTENSION: ICD-10-CM

## 2022-02-23 PROCEDURE — 99214 OFFICE O/P EST MOD 30 MIN: CPT | Performed by: FAMILY MEDICINE

## 2022-02-23 RX ORDER — VALSARTAN 320 MG/1
320 TABLET ORAL DAILY
Qty: 90 TABLET | Refills: 0 | Status: SHIPPED | OUTPATIENT
Start: 2022-02-23 | End: 2022-06-09 | Stop reason: SDUPTHER

## 2022-02-23 NOTE — PROGRESS NOTES
"Chief Complaint  Hypothyroidism    Subjective          Mer Ramos presents to [facility]  Patient is here to follow up on her chronic medical problems:    Patient was diagnosed with hypertension in the year 2000.  She currently takes valsartan 160 mg daily.  Blood pressures are well controlled at home but here are elevated. 130/84 last week.    Hashimoto's thyroiditis in 1977.  She then had a L subtotal thyroidectomy,  in 2005 and then the right side was removed in 2018.  She currently takes levothyroxine 125 mcg daily.  Thyroid is stable    GERD.  Seen by GI for colonoscopy in January 2022.  Was started on omeprazole 20 mg daily for reflux.  The patient states that her heartburn is much better.  She is also trying to avoid foods that trigger her heartburn.      Objective   Vital Signs:   /90   Pulse 91   Temp 97.5 °F (36.4 °C)   Ht 165.7 cm (65.25\")   Wt 93.6 kg (206 lb 6.4 oz)   SpO2 98%   BMI 34.08 kg/m²     Physical Exam  Vitals and nursing note reviewed.   Constitutional:       Appearance: Normal appearance. She is well-developed. She is obese.   HENT:      Head: Normocephalic and atraumatic.      Right Ear: External ear normal.      Left Ear: External ear normal.      Nose: Nose normal.   Eyes:      General: No scleral icterus.     Conjunctiva/sclera: Conjunctivae normal.   Cardiovascular:      Rate and Rhythm: Normal rate and regular rhythm.      Heart sounds: Normal heart sounds.   Pulmonary:      Effort: Pulmonary effort is normal.      Breath sounds: Normal breath sounds.   Musculoskeletal:      Cervical back: Normal range of motion and neck supple.      Right lower leg: No edema.      Left lower leg: No edema.   Lymphadenopathy:      Cervical: No cervical adenopathy.   Skin:     General: Skin is warm and dry.      Findings: No rash.   Neurological:      Mental Status: She is alert and oriented to person, place, and time.   Psychiatric:         Mood and Affect: " Mood normal.         Behavior: Behavior normal.         Thought Content: Thought content normal.         Judgment: Judgment normal.        Result Review :   The following data was reviewed by: Jeannette Merritt DO on 02/23/2022:  Common labs    Common Labsle 4/20/21 7/21/21 7/21/21 11/3/21     1006 1006    Glucose   96    BUN   11    Creatinine   0.97    eGFR Non  Am   56 (A)    eGFR African Am   68    Sodium   141    Potassium   4.4    Chloride   105    Calcium   9.5    Total Protein   7.2    Albumin   4.30    Total Bilirubin   0.4    Alkaline Phosphatase   34 (A)    AST (SGOT)   20    ALT (SGPT)   12    WBC 5.82 4.60  4.72   Hemoglobin 12.1 12.5  12.3   Hematocrit 35.8 (A) 38.0  36.3   Platelets 160 170  166   (A) Abnormal value       Comments are available for some flowsheets but are not being displayed.           TSH    TSH 4/8/21 6/3/21 7/21/21   TSH 6.150 (A) 6.540 (A) 1.600   (A) Abnormal value                      Assessment and Plan    Diagnoses and all orders for this visit:    1. 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 Benign essential hypertension  -     valsartan (DIOVAN) 320 MG tablet; Take 1 tablet by mouth Daily.  Dispense: 90 tablet; Refill: 0  -     Comprehensive Metabolic Panel    3. Gastroesophageal reflux disease without esophagitis    4. Encounter for long-term (current) use of medications  -     TSH  -     CBC & Differential  -     Comprehensive Metabolic Panel    5. Statin intolerance      Patient is here today for chronic stable hypothyroidism, and reflux.  She is also here for uncontrolled hypertension.  I would like to increase the valsartan to 320 mg daily.  The patient will contact me if she has any issues.  Surveillance labs were obtained today and any medication changes will be made based on lab results and will be called to the patient later this week.      Follow Up   Return in about 3 months (around 5/23/2022) for HTN, Thyroid.  Patient was  given instructions and counseling regarding her condition or for health maintenance advice. Please see specific information pulled into the AVS if appropriate.

## 2022-02-24 LAB
ALBUMIN SERPL-MCNC: 4.4 G/DL (ref 3.7–4.7)
ALBUMIN/GLOB SERPL: 1.4 {RATIO} (ref 1.2–2.2)
ALP SERPL-CCNC: 34 IU/L (ref 44–121)
ALT SERPL-CCNC: 13 IU/L (ref 0–32)
AST SERPL-CCNC: 19 IU/L (ref 0–40)
BASOPHILS # BLD AUTO: 0 X10E3/UL (ref 0–0.2)
BASOPHILS NFR BLD AUTO: 1 %
BILIRUB SERPL-MCNC: 0.5 MG/DL (ref 0–1.2)
BUN SERPL-MCNC: 14 MG/DL (ref 8–27)
BUN/CREAT SERPL: 16 (ref 12–28)
CALCIUM SERPL-MCNC: 9.6 MG/DL (ref 8.7–10.3)
CHLORIDE SERPL-SCNC: 101 MMOL/L (ref 96–106)
CO2 SERPL-SCNC: 23 MMOL/L (ref 20–29)
CREAT SERPL-MCNC: 0.9 MG/DL (ref 0.57–1)
EOSINOPHIL # BLD AUTO: 0.2 X10E3/UL (ref 0–0.4)
EOSINOPHIL NFR BLD AUTO: 3 %
ERYTHROCYTE [DISTWIDTH] IN BLOOD BY AUTOMATED COUNT: 14.1 % (ref 11.7–15.4)
GLOBULIN SER CALC-MCNC: 3.1 G/DL (ref 1.5–4.5)
GLUCOSE SERPL-MCNC: 101 MG/DL (ref 65–99)
HCT VFR BLD AUTO: 39.3 % (ref 34–46.6)
HGB BLD-MCNC: 12.8 G/DL (ref 11.1–15.9)
IMM GRANULOCYTES # BLD AUTO: 0 X10E3/UL (ref 0–0.1)
IMM GRANULOCYTES NFR BLD AUTO: 0 %
LYMPHOCYTES # BLD AUTO: 1.6 X10E3/UL (ref 0.7–3.1)
LYMPHOCYTES NFR BLD AUTO: 30 %
MCH RBC QN AUTO: 27.5 PG (ref 26.6–33)
MCHC RBC AUTO-ENTMCNC: 32.6 G/DL (ref 31.5–35.7)
MCV RBC AUTO: 85 FL (ref 79–97)
MONOCYTES # BLD AUTO: 0.5 X10E3/UL (ref 0.1–0.9)
MONOCYTES NFR BLD AUTO: 9 %
NEUTROPHILS # BLD AUTO: 3 X10E3/UL (ref 1.4–7)
NEUTROPHILS NFR BLD AUTO: 57 %
PLATELET # BLD AUTO: 181 X10E3/UL (ref 150–450)
POTASSIUM SERPL-SCNC: 3.9 MMOL/L (ref 3.5–5.2)
PROT SERPL-MCNC: 7.5 G/DL (ref 6–8.5)
RBC # BLD AUTO: 4.65 X10E6/UL (ref 3.77–5.28)
SODIUM SERPL-SCNC: 139 MMOL/L (ref 134–144)
TSH SERPL DL<=0.005 MIU/L-ACNC: 1.39 UIU/ML (ref 0.45–4.5)
WBC # BLD AUTO: 5.3 X10E3/UL (ref 3.4–10.8)

## 2022-02-25 DIAGNOSIS — E06.3 HYPOTHYROIDISM DUE TO HASHIMOTO'S THYROIDITIS: ICD-10-CM

## 2022-02-25 DIAGNOSIS — E03.8 HYPOTHYROIDISM DUE TO HASHIMOTO'S THYROIDITIS: ICD-10-CM

## 2022-02-25 RX ORDER — LEVOTHYROXINE SODIUM 0.12 MG/1
125 TABLET ORAL DAILY
Qty: 90 TABLET | Refills: 0 | Status: SHIPPED | OUTPATIENT
Start: 2022-02-25 | End: 2022-05-25 | Stop reason: SDUPTHER

## 2022-03-08 ENCOUNTER — TELEPHONE (OUTPATIENT)
Dept: FAMILY MEDICINE CLINIC | Facility: CLINIC | Age: 77
End: 2022-03-08

## 2022-03-08 NOTE — TELEPHONE ENCOUNTER
PATIENT CALLED AND WANTED TO KNOW IF DO. EDGARD HAS SENT THE REFERRAL TO [facility] PHYSIOTHERAPY FOR PHYSICAL THERAPY - HIP/THIGH     PLEASE CALL/ ADVISE   Mer Ramos (Self) [phone] (H)

## 2022-03-11 ENCOUNTER — OFFICE VISIT (OUTPATIENT)
Dept: FAMILY MEDICINE CLINIC | Facility: CLINIC | Age: 77
End: 2022-03-11

## 2022-03-11 VITALS
SYSTOLIC BLOOD PRESSURE: 160 MMHG | BODY MASS INDEX: 34.26 KG/M2 | DIASTOLIC BLOOD PRESSURE: 76 MMHG | OXYGEN SATURATION: 96 % | WEIGHT: 205.6 LBS | HEART RATE: 93 BPM | HEIGHT: 65 IN | TEMPERATURE: 96.6 F

## 2022-03-11 DIAGNOSIS — M51.36 DDD (DEGENERATIVE DISC DISEASE), LUMBAR: Primary | ICD-10-CM

## 2022-03-11 DIAGNOSIS — M46.1 SACROILIITIS: ICD-10-CM

## 2022-03-11 PROCEDURE — 99213 OFFICE O/P EST LOW 20 MIN: CPT | Performed by: FAMILY MEDICINE

## 2022-03-11 NOTE — PROGRESS NOTES
"Chief Complaint  Hip Pain (thigh)    Subjective          Mer Ramos presents to [facility]  Patient is here today with a new problem.  She fell on January 28, 2022 injuring her left hip.  She has gotten over most of the bumps and bruises but now is having low back pain on the left side that radiates down into the front of the left lower extremity and stops at the knee.  The patient feels the pain specifically and certain positions while trying to sleep or trying to sit.  She did have some improvement during exercise but after exercise the pain returns.  Patient denies any numbness or tingling and denies any change in bowel or bladder function.      Objective   Vital Signs:   /76   Pulse 93   Temp 96.6 °F (35.9 °C)   Ht 165.7 cm (65.25\")   Wt 93.3 kg (205 lb 9.6 oz)   SpO2 96%   BMI 33.95 kg/m²     Physical Exam  Vitals and nursing note reviewed.   Constitutional:       Appearance: Normal appearance. She is well-developed. She is obese.   HENT:      Head: Normocephalic and atraumatic.      Right Ear: External ear normal.      Left Ear: External ear normal.      Nose: Nose normal.   Eyes:      General: No scleral icterus.     Conjunctiva/sclera: Conjunctivae normal.   Pulmonary:      Effort: Pulmonary effort is normal.   Musculoskeletal:         General: Tenderness (TTP of the Left SI joint and lower lumbar spine) present.      Cervical back: Normal range of motion and neck supple.      Right lower leg: No edema.      Left lower leg: No edema.      Comments: Slightly decreased range of motion due to increased pain of the lumbar spine with rightward rotation and rightward sidebending and extension.  Normal range of motion with flexion.   Lymphadenopathy:      Cervical: No cervical adenopathy.   Skin:     General: Skin is warm and dry.      Findings: No rash.   Neurological:      Mental Status: She is alert and oriented to person, place, and time.   Psychiatric:         Mood " and Affect: Mood normal.         Behavior: Behavior normal.         Thought Content: Thought content normal.         Judgment: Judgment normal.        Result Review :   The following data was reviewed by: Jeannette Merritt DO on 03/11/2022:  Common labs    Common Labsle 7/21/21 7/21/21 11/3/21 2/23/22 2/23/22    1006 1006  0845 0845   Glucose  96   101 (A)   BUN  11   14   Creatinine  0.97   0.90   eGFR Non African Am  56 (A)   63   eGFR  Am  68   72   Sodium  141   139   Potassium  4.4   3.9   Chloride  105   101   Calcium  9.5   9.6   Total Protein  7.2   7.5   Albumin  4.30   4.4   Total Bilirubin  0.4   0.5   Alkaline Phosphatase  34 (A)   34 (A)   AST (SGOT)  20   19   ALT (SGPT)  12   13   WBC 4.60  4.72 5.3    Hemoglobin 12.5  12.3 12.8    Hematocrit 38.0  36.3 39.3    Platelets 170  166 181    (A) Abnormal value       Comments are available for some flowsheets but are not being displayed.           TSH    TSH 6/3/21 7/21/21 2/23/22   TSH 6.540 (A) 1.600 1.390   (A) Abnormal value                      Assessment and Plan    Diagnoses and all orders for this visit:    1. DDD (degenerative disc disease), lumbar (Primary)  -     Ambulatory Referral to Physical Therapy Evaluate and treat    2. Sacroiliitis (HCC)  -     Ambulatory Referral to Physical Therapy Evaluate and treat    Patient is here today with a new problem of left-sided low back pain with radiation into the left lower extremity.  Patient is unable to tolerate any NSAIDs or other narcotic pain medicines.  Patient refused prescription for pain.  Referral to physical therapy was entered.  Patient was advised to use ice applications as often as possible.  She was shown exercises which will help alleviate her pain.  The patient was advised to apply ice after exercise.  If her symptoms are not improving with physical therapy she was advised to follow-up.      Follow Up   Return if symptoms worsen or fail to improve.  Patient was given instructions  and counseling regarding her condition or for health maintenance advice. Please see specific information pulled into the AVS if appropriate.

## 2022-05-24 ENCOUNTER — OFFICE VISIT (OUTPATIENT)
Dept: FAMILY MEDICINE CLINIC | Facility: CLINIC | Age: 77
End: 2022-05-24

## 2022-05-24 VITALS
WEIGHT: 207.6 LBS | HEIGHT: 65 IN | OXYGEN SATURATION: 99 % | SYSTOLIC BLOOD PRESSURE: 138 MMHG | BODY MASS INDEX: 34.59 KG/M2 | DIASTOLIC BLOOD PRESSURE: 78 MMHG | HEART RATE: 86 BPM | TEMPERATURE: 96.5 F

## 2022-05-24 DIAGNOSIS — D72.829 LEUKOCYTOSIS, UNSPECIFIED TYPE: ICD-10-CM

## 2022-05-24 DIAGNOSIS — Z79.899 ENCOUNTER FOR LONG-TERM (CURRENT) USE OF MEDICATIONS: ICD-10-CM

## 2022-05-24 DIAGNOSIS — M54.42 CHRONIC LEFT-SIDED LOW BACK PAIN WITH LEFT-SIDED SCIATICA: ICD-10-CM

## 2022-05-24 DIAGNOSIS — R35.0 FREQUENCY OF URINATION: ICD-10-CM

## 2022-05-24 DIAGNOSIS — E03.8 HYPOTHYROIDISM DUE TO HASHIMOTO'S THYROIDITIS: Primary | ICD-10-CM

## 2022-05-24 DIAGNOSIS — I10 BENIGN ESSENTIAL HYPERTENSION: ICD-10-CM

## 2022-05-24 DIAGNOSIS — G89.29 CHRONIC LEFT-SIDED LOW BACK PAIN WITH LEFT-SIDED SCIATICA: ICD-10-CM

## 2022-05-24 DIAGNOSIS — E06.3 HYPOTHYROIDISM DUE TO HASHIMOTO'S THYROIDITIS: Primary | ICD-10-CM

## 2022-05-24 DIAGNOSIS — K21.9 GASTROESOPHAGEAL REFLUX DISEASE WITHOUT ESOPHAGITIS: ICD-10-CM

## 2022-05-24 LAB
BILIRUB BLD-MCNC: NEGATIVE MG/DL
CLARITY, POC: CLEAR
COLOR UR: YELLOW
EXPIRATION DATE: ABNORMAL
GLUCOSE UR STRIP-MCNC: NEGATIVE MG/DL
KETONES UR QL: NEGATIVE
LEUKOCYTE EST, POC: ABNORMAL
Lab: ABNORMAL
NITRITE UR-MCNC: NEGATIVE MG/ML
PH UR: 6.5 [PH] (ref 5–8)
PROT UR STRIP-MCNC: NEGATIVE MG/DL
RBC # UR STRIP: NEGATIVE /UL
SP GR UR: 1.01 (ref 1–1.03)
UROBILINOGEN UR QL: NORMAL

## 2022-05-24 PROCEDURE — 81003 URINALYSIS AUTO W/O SCOPE: CPT | Performed by: FAMILY MEDICINE

## 2022-05-24 PROCEDURE — 99214 OFFICE O/P EST MOD 30 MIN: CPT | Performed by: FAMILY MEDICINE

## 2022-05-24 NOTE — PROGRESS NOTES
"Chief Complaint  Hypertension, Hypothyroidism, and Back Pain    Subjective          Mer Ramos presents to [facility]  Patient is here to follow up on her chronic medical problems:    Patient was diagnosed with hypertension in the year 2000.  She currently takes valsartan 320 mg daily.  Blood pressures are well controlled at home but here are elevated. 130/84 last week.    Hashimoto's thyroiditis in 1977.  She then had a L subtotal thyroidectomy,  in 2005 and then the right side was removed in 2018.  She currently takes levothyroxine 125 mcg daily.  Thyroid is stable    GERD.  Seen by GI for colonoscopy in January 2022.  Was started on omeprazole 20 mg daily for reflux.  The patient states that her heartburn is much better.  She is also trying to avoid foods that trigger her heartburn.    Patient is also here to discuss her lumbar back pain.  This has been a chronic problem for the past 6 years.  She has had some intermittent flareups.  Her current flareup has been present for about 4 months.  She has tried physical therapy and chiropractic manipulation and neither have been helpful.  She was unable to tolerate many of the physical therapy exercises increased pain.  Patient denies any loss of continence of bowel or bladder.  She also denies any saddle numbness.  However she does state that her left anterior thigh is slightly numb and the location of the referred pain in that area as well.    Patient was also told to follow-up with after her last UTI last month.  She states that all of her symptoms are gone.  She denies any urinary symptoms.  She is having back pain as described above.      Objective   Vital Signs:  /78   Pulse 86   Temp 96.5 °F (35.8 °C)   Ht 165.1 cm (65\")   Wt 94.2 kg (207 lb 9.6 oz)   SpO2 99%   BMI 34.55 kg/m²         Physical Exam  Vitals and nursing note reviewed.   Constitutional:       Appearance: Normal appearance. She is well-developed. She is " obese.   HENT:      Head: Normocephalic and atraumatic.   Eyes:      General: No scleral icterus.     Conjunctiva/sclera: Conjunctivae normal.   Cardiovascular:      Rate and Rhythm: Normal rate and regular rhythm.      Heart sounds: Normal heart sounds.   Pulmonary:      Effort: Pulmonary effort is normal.      Breath sounds: Normal breath sounds.   Musculoskeletal:         General: Normal range of motion.      Cervical back: Normal range of motion and neck supple.      Right lower leg: No edema.      Left lower leg: No edema.   Skin:     General: Skin is warm and dry.      Capillary Refill: Capillary refill takes less than 2 seconds.      Findings: No rash.   Neurological:      Mental Status: She is alert and oriented to person, place, and time.      Sensory: No sensory deficit.      Motor: No weakness.      Coordination: Coordination normal.      Gait: Gait normal.      Deep Tendon Reflexes: Reflexes normal.   Psychiatric:         Mood and Affect: Mood normal.         Behavior: Behavior normal.         Thought Content: Thought content normal.         Judgment: Judgment normal.        Result Review :   The following data was reviewed by: Jeannette Merritt DO on 05/24/2022:  Common labs    Common Labsle 11/3/21 2/23/22 2/23/22 5/5/22     0845 0845    Glucose   101 (A)    BUN   14    Creatinine   0.90    eGFR Non  Am   63    eGFR African Am   72    Sodium   139    Potassium   3.9    Chloride   101    Calcium   9.6    Total Protein   7.5    Albumin   4.4    Total Bilirubin   0.5    Alkaline Phosphatase   34 (A)    AST (SGOT)   19    ALT (SGPT)   13    WBC 4.72 5.3  5.23   Hemoglobin 12.3 12.8  12.2   Hematocrit 36.3 39.3  36.5   Platelets 166 181  162   (A) Abnormal value       Comments are available for some flowsheets but are not being displayed.           TSH    TSH 6/3/21 7/21/21 2/23/22   TSH 6.540 (A) 1.600 1.390   (A) Abnormal value            Data reviewed: Radiologic studies lumbar xrays see below:         XR Spine Lumbar 2 or 3 View (02/11/2022 11:52)    Assessment and Plan    Diagnoses and all orders for this visit:    1. Hypothyroidism due to Hashimoto's thyroiditis (Primary)  -     TSH  -     Comprehensive Metabolic Panel    2. Benign essential hypertension  -     TSH  -     Comprehensive Metabolic Panel    3. Gastroesophageal reflux disease without esophagitis  -     TSH  -     Comprehensive Metabolic Panel    4. Encounter for long-term (current) use of medications  -     TSH  -     Comprehensive Metabolic Panel    5. Leukocytosis, unspecified type  -     POCT urinalysis dipstick, automated  -     Urine Culture - Urine, Urine, Clean Catch; Future  -     Urine Culture - Urine, Urine, Clean Catch    6. Frequency of urination  -     Urine Culture - Urine, Urine, Clean Catch; Future  -     Urine Culture - Urine, Urine, Clean Catch    7. Chronic left-sided low back pain with left-sided sciatica  -     Urine Culture - Urine, Urine, Clean Catch; Future  -     Urine Culture - Urine, Urine, Clean Catch  -     MRI Lumbar Spine Without Contrast; Future      Patient is here today for chronic stable hypertension, hypothyroidism, and GERD.  Surveillance labs were obtained today and any medication changes will be made based on lab results and will be called to the patient later this week.    Repeat urine culture.  Patient asymptomatic    Lumbar radiculopathy.  Patient will get an MRI and if it is supportive of a referral to pain management that would be our next step.       Follow Up   Return in about 3 months (around 8/24/2022) for Medicare Wellness, HTN.  Patient was given instructions and counseling regarding her condition or for health maintenance advice. Please see specific information pulled into the AVS if appropriate.

## 2022-05-25 DIAGNOSIS — E03.8 HYPOTHYROIDISM DUE TO HASHIMOTO'S THYROIDITIS: ICD-10-CM

## 2022-05-25 DIAGNOSIS — E06.3 HYPOTHYROIDISM DUE TO HASHIMOTO'S THYROIDITIS: ICD-10-CM

## 2022-05-25 LAB
ALBUMIN SERPL-MCNC: 4.4 G/DL (ref 3.7–4.7)
ALBUMIN/GLOB SERPL: 1.6 {RATIO} (ref 1.2–2.2)
ALP SERPL-CCNC: 33 IU/L (ref 44–121)
ALT SERPL-CCNC: 11 IU/L (ref 0–32)
AST SERPL-CCNC: 14 IU/L (ref 0–40)
BILIRUB SERPL-MCNC: 0.3 MG/DL (ref 0–1.2)
BUN SERPL-MCNC: 12 MG/DL (ref 8–27)
BUN/CREAT SERPL: 14 (ref 12–28)
CALCIUM SERPL-MCNC: 9.6 MG/DL (ref 8.7–10.3)
CHLORIDE SERPL-SCNC: 104 MMOL/L (ref 96–106)
CO2 SERPL-SCNC: 23 MMOL/L (ref 20–29)
CREAT SERPL-MCNC: 0.86 MG/DL (ref 0.57–1)
EGFRCR SERPLBLD CKD-EPI 2021: 70 ML/MIN/1.73
GLOBULIN SER CALC-MCNC: 2.8 G/DL (ref 1.5–4.5)
GLUCOSE SERPL-MCNC: 93 MG/DL (ref 65–99)
POTASSIUM SERPL-SCNC: 4.1 MMOL/L (ref 3.5–5.2)
PROT SERPL-MCNC: 7.2 G/DL (ref 6–8.5)
SODIUM SERPL-SCNC: 142 MMOL/L (ref 134–144)
TSH SERPL DL<=0.005 MIU/L-ACNC: 0.63 UIU/ML (ref 0.45–4.5)

## 2022-05-25 RX ORDER — LEVOTHYROXINE SODIUM 0.12 MG/1
125 TABLET ORAL DAILY
Qty: 90 TABLET | Refills: 1 | Status: SHIPPED | OUTPATIENT
Start: 2022-05-25 | End: 2022-08-26 | Stop reason: SDUPTHER

## 2022-05-26 LAB
BACTERIA UR CULT: NO GROWTH
BACTERIA UR CULT: NORMAL

## 2022-06-09 ENCOUNTER — TELEMEDICINE (OUTPATIENT)
Dept: FAMILY MEDICINE CLINIC | Facility: CLINIC | Age: 77
End: 2022-06-09

## 2022-06-09 DIAGNOSIS — R53.81 MALAISE AND FATIGUE: ICD-10-CM

## 2022-06-09 DIAGNOSIS — R35.0 URINARY FREQUENCY: Primary | ICD-10-CM

## 2022-06-09 DIAGNOSIS — I10 BENIGN ESSENTIAL HYPERTENSION: ICD-10-CM

## 2022-06-09 DIAGNOSIS — R53.83 MALAISE AND FATIGUE: ICD-10-CM

## 2022-06-09 PROCEDURE — 99213 OFFICE O/P EST LOW 20 MIN: CPT | Performed by: FAMILY MEDICINE

## 2022-06-09 RX ORDER — VALSARTAN 320 MG/1
320 TABLET ORAL DAILY
Qty: 90 TABLET | Refills: 1 | Status: SHIPPED | OUTPATIENT
Start: 2022-06-09 | End: 2022-08-25 | Stop reason: SDUPTHER

## 2022-06-09 RX ORDER — HYDROXYZINE HYDROCHLORIDE 25 MG/1
25 TABLET, FILM COATED ORAL EVERY 8 HOURS PRN
Qty: 30 TABLET | Refills: 2 | Status: SHIPPED | OUTPATIENT
Start: 2022-06-09 | End: 2023-01-05

## 2022-06-09 NOTE — PROGRESS NOTES
Chief Complaint  Urinary Tract Infection (Went to  for UTI taking keflex ) and Anxiety    Subjective         Mer Ramos presents to [facility]  Patient requested a video visit during the coronavirus pandemic to discuss her recent visit to the urgent care clinic for a UTI and anxiety. Yesterday she started feeling bloated, dizzy, cold and shaky. +urinary frequency.  Today she is still feeling fatigued, dizzy, and achy joints but overall she is a little better.  She started taking Keflex this AM. She also took hydroxyzine last PM and it helped her sleep.  She is much calmer today and less anxious.  Patient still denies any burning with urination or back pain.  She has not been feverish.  Of note over the past year all of her urine cultures have been negative for bacterial growth.  Patient has been seen by Dr. Gonzalez, urology who did a cystoscopy which was normal per the patient's report.  The patient states that Dr. Gonzalez stated that she did not need to return to him unless I felt there was something else he needed to evaluate.      Objective   Vital Signs:   There were no vitals taken for this visit.    Physical Exam   Constitutional: She appears well-developed and well-nourished.   HENT:   Head: Normocephalic and atraumatic.   Eyes: Conjunctivae are normal.   Pulmonary/Chest: Effort normal.   Neurological: She is alert.   Psychiatric: She has a normal mood and affect.     Result Review :   The following data was reviewed by: Jeannette Merritt DO on 06/09/2022:  Common labs    Common Labsle 2/23/22 2/23/22 5/5/22 5/24/22    0845 0845     Glucose  101 (A)  93   BUN  14  12   Creatinine  0.90  0.86   eGFR Non  Am  63     eGFR African Am  72     Sodium  139  142   Potassium  3.9  4.1   Chloride  101  104   Calcium  9.6  9.6   Total Protein  7.5  7.2   Albumin  4.4  4.4   Total Bilirubin  0.5  0.3   Alkaline Phosphatase  34 (A)  33 (A)   AST (SGOT)  19  14   ALT (SGPT)  13  11    WBC 5.3  5.23    Hemoglobin 12.8  12.2    Hematocrit 39.3  36.5    Platelets 181  162    (A) Abnormal value       Comments are available for some flowsheets but are not being displayed.           UA    Urinalysis 4/26/22 5/24/22 6/8/22   Ketones, UA Trace (A) Negative Negative   Leukocytes, UA Large (3+) (A) Small (1+) (A) Large (3+) (A)   (A) Abnormal value            Urine Culture    Urine Culture 2/11/22 4/26/22 5/24/22   Urine Culture Final report Final report Final report           Data reviewed: Urgent care notes and UA          Assessment and Plan    Diagnoses and all orders for this visit:    1. Urinary frequency (Primary)  -     hydrOXYzine (ATARAX) 25 MG tablet; Take 1 tablet by mouth Every 8 (Eight) Hours As Needed for Anxiety.  Dispense: 30 tablet; Refill: 2    2. Malaise and fatigue    Patient is seen via video visit today for the above acute complaints.  Her symptoms seem to be improving after starting Keflex this morning.  She was encouraged to continue hydroxyzine nightly to help with sleep and anxiety.  She was also advised to continue drinking plenty of water every day.  If her symptoms persist after the antibiotics or worsen she was advised to follow-up.  If the urine culture does not grow any bacteria this time the patient was advised that she could stop her antibiotic early.      I spent 25 minutes caring for Mer on this date of service. This time includes time spent by me in the following activities:reviewing tests, performing a medically appropriate examination and/or evaluation , counseling and educating the patient/family/caregiver, documenting information in the medical record and independently interpreting results and communicating that information with the patient/family/caregiver  Follow Up   No follow-ups on file.  Patient was given instructions and counseling regarding her condition or for health maintenance advice. Please see specific information pulled into the AVS if  appropriate.     Mode of Visit: Video  Location of patient: home  You have chosen to receive care through a telehealth visit.  Does the patient consent to use a video/audio connection for your medical care today? Yes  The visit included audio and video interaction. No technical issues occurred during this visit.

## 2022-06-13 ENCOUNTER — OFFICE VISIT (OUTPATIENT)
Dept: FAMILY MEDICINE CLINIC | Facility: CLINIC | Age: 77
End: 2022-06-13

## 2022-06-13 VITALS
WEIGHT: 205.4 LBS | TEMPERATURE: 97.8 F | SYSTOLIC BLOOD PRESSURE: 150 MMHG | DIASTOLIC BLOOD PRESSURE: 86 MMHG | BODY MASS INDEX: 35.07 KG/M2 | HEART RATE: 92 BPM | OXYGEN SATURATION: 94 % | HEIGHT: 64 IN

## 2022-06-13 DIAGNOSIS — R53.83 FATIGUE, UNSPECIFIED TYPE: Primary | ICD-10-CM

## 2022-06-13 PROCEDURE — 93000 ELECTROCARDIOGRAM COMPLETE: CPT | Performed by: FAMILY MEDICINE

## 2022-06-13 PROCEDURE — 99214 OFFICE O/P EST MOD 30 MIN: CPT | Performed by: FAMILY MEDICINE

## 2022-06-13 NOTE — PROGRESS NOTES
"Chief Complaint  Fatigue and Dizziness    Subjective        Mer Ramos presents to [facility]  Patient is here today for some new symptoms.  She stated that she developed a low-grade fever along with headache and fatigue after being seen by telehealth visit on June 9, 2022.  During that visit it was suspected that she may have had a urinary tract infection and she was already on antibiotics provided by the urgent care center.  Urine culture was not yet back at the time of the telehealth visit and the patient was advised to continue the Keflex antibiotics until the culture came back negative.  She was then instructed to discontinue the antibiotic.  She then developed worsening fatigue, headache, and intermittent dizziness with position changes.  Patient denies any palpitations, chest pain, or shortness of breath.  She went to urgent care on June 11 and was tested for COVID which was negative.  The patient states that she has no longer been exercising for the past few months.  She states that her legs feel weak bilaterally but denies any unilateral weakness.  Patient denies any other neurologic symptoms.  Patient states that the symptoms came on gradually.      Objective   Vital Signs:  /86   Pulse 92   Temp 97.8 °F (36.6 °C)   Ht 162.6 cm (64\")   Wt 93.2 kg (205 lb 6.4 oz)   SpO2 94%   BMI 35.26 kg/m²   Estimated body mass index is 35.26 kg/m² as calculated from the following:    Height as of this encounter: 162.6 cm (64\").    Weight as of this encounter: 93.2 kg (205 lb 6.4 oz).          Physical Exam  Vitals and nursing note reviewed.   Constitutional:       Appearance: Normal appearance. She is well-developed and normal weight.   HENT:      Head: Normocephalic and atraumatic.   Eyes:      General: No scleral icterus.     Conjunctiva/sclera: Conjunctivae normal.   Cardiovascular:      Rate and Rhythm: Normal rate and regular rhythm.      Heart sounds: Normal heart " sounds.   Pulmonary:      Effort: Pulmonary effort is normal.      Breath sounds: Normal breath sounds.   Abdominal:      General: Bowel sounds are normal.      Palpations: Abdomen is soft.   Musculoskeletal:         General: Normal range of motion.      Cervical back: Normal range of motion and neck supple.   Skin:     General: Skin is warm and dry.      Capillary Refill: Capillary refill takes less than 2 seconds.      Findings: No rash.   Neurological:      Mental Status: She is alert and oriented to person, place, and time.   Psychiatric:         Mood and Affect: Mood normal.         Behavior: Behavior normal.         Thought Content: Thought content normal.         Judgment: Judgment normal.        Result Review :{Labs  Result Review  Imaging  Med Tab  Media  Procedures  :23}  The following data was reviewed by: Jeannette Merritt DO on 06/13/2022:  Common labs    Common Labsle 2/23/22 2/23/22 5/5/22 5/24/22    0845 0845     Glucose  101 (A)  93   BUN  14  12   Creatinine  0.90  0.86   eGFR Non  Am  63     eGFR African Am  72     Sodium  139  142   Potassium  3.9  4.1   Chloride  101  104   Calcium  9.6  9.6   Total Protein  7.5  7.2   Albumin  4.4  4.4   Total Bilirubin  0.5  0.3   Alkaline Phosphatase  34 (A)  33 (A)   AST (SGOT)  19  14   ALT (SGPT)  13  11   WBC 5.3  5.23    Hemoglobin 12.8  12.2    Hematocrit 39.3  36.5    Platelets 181  162    (A) Abnormal value       Comments are available for some flowsheets but are not being displayed.           TSH    TSH 7/21/21 2/23/22 5/24/22   TSH 1.600 1.390 0.628                ECG 12 Lead    Date/Time: 6/13/2022 3:54 PM  Performed by: Jeannette Merritt DO  Authorized by: Jeannette Merrtit DO   Comparison: compared with previous ECG from 5/16/2017  Similar to previous ECG  Rhythm: sinus rhythm  Rate: normal  Conduction: conduction normal  QRS axis: normal  Other findings: non-specific ST-T wave changes    Clinical impression: non-specific ECG          UC with Gogo Lerma APRN (06/11/2022)       Assessment and Plan   Diagnoses and all orders for this visit:    1. Fatigue, unspecified type (Primary)  -     ECG 12 Lead  -     CBC & Differential  -     Comprehensive Metabolic Panel  -     TSH  -     Lipase  -     Amylase    Patient seen today for gradual onset of fatigue and headache.  Clinical neurologic exam was nonfocal.  EKG was unchanged from 2017 and noncontributory.  Will evaluate labs to see if there are any metabolic causes for her symptoms.  Patient was given the okay to restart cardiovascular exercise gradually.  A possible cause for the fatigue could be deconditioning from a lack of exercise.  Follow-up will be based on results or persistent symptoms.         Follow Up   Return if symptoms worsen or fail to improve.  Patient was given instructions and counseling regarding her condition or for health maintenance advice. Please see specific information pulled into the AVS if appropriate.

## 2022-06-14 ENCOUNTER — HOSPITAL ENCOUNTER (OUTPATIENT)
Dept: MRI IMAGING | Facility: HOSPITAL | Age: 77
End: 2022-06-14

## 2022-06-14 LAB
ALBUMIN SERPL-MCNC: 4.2 G/DL (ref 3.7–4.7)
ALBUMIN/GLOB SERPL: 1.5 {RATIO} (ref 1.2–2.2)
ALP SERPL-CCNC: 39 IU/L (ref 44–121)
ALT SERPL-CCNC: 9 IU/L (ref 0–32)
AMYLASE SERPL-CCNC: 76 U/L (ref 31–110)
AST SERPL-CCNC: 13 IU/L (ref 0–40)
BASOPHILS # BLD AUTO: 0.1 X10E3/UL (ref 0–0.2)
BASOPHILS NFR BLD AUTO: 1 %
BILIRUB SERPL-MCNC: 0.3 MG/DL (ref 0–1.2)
BUN SERPL-MCNC: 10 MG/DL (ref 8–27)
BUN/CREAT SERPL: 11 (ref 12–28)
CALCIUM SERPL-MCNC: 9.5 MG/DL (ref 8.7–10.3)
CHLORIDE SERPL-SCNC: 103 MMOL/L (ref 96–106)
CO2 SERPL-SCNC: 25 MMOL/L (ref 20–29)
CREAT SERPL-MCNC: 0.88 MG/DL (ref 0.57–1)
EGFRCR SERPLBLD CKD-EPI 2021: 68 ML/MIN/1.73
EOSINOPHIL # BLD AUTO: 0.2 X10E3/UL (ref 0–0.4)
EOSINOPHIL NFR BLD AUTO: 3 %
ERYTHROCYTE [DISTWIDTH] IN BLOOD BY AUTOMATED COUNT: 13.2 % (ref 11.7–15.4)
GLOBULIN SER CALC-MCNC: 2.8 G/DL (ref 1.5–4.5)
GLUCOSE SERPL-MCNC: 93 MG/DL (ref 65–99)
HCT VFR BLD AUTO: 36.8 % (ref 34–46.6)
HGB BLD-MCNC: 12.5 G/DL (ref 11.1–15.9)
IMM GRANULOCYTES # BLD AUTO: 0 X10E3/UL (ref 0–0.1)
IMM GRANULOCYTES NFR BLD AUTO: 0 %
LIPASE SERPL-CCNC: 55 U/L (ref 14–85)
LYMPHOCYTES # BLD AUTO: 1.6 X10E3/UL (ref 0.7–3.1)
LYMPHOCYTES NFR BLD AUTO: 27 %
MCH RBC QN AUTO: 28.6 PG (ref 26.6–33)
MCHC RBC AUTO-ENTMCNC: 34 G/DL (ref 31.5–35.7)
MCV RBC AUTO: 84 FL (ref 79–97)
MONOCYTES # BLD AUTO: 0.5 X10E3/UL (ref 0.1–0.9)
MONOCYTES NFR BLD AUTO: 9 %
NEUTROPHILS # BLD AUTO: 3.4 X10E3/UL (ref 1.4–7)
NEUTROPHILS NFR BLD AUTO: 60 %
PLATELET # BLD AUTO: 168 X10E3/UL (ref 150–450)
POTASSIUM SERPL-SCNC: 4.3 MMOL/L (ref 3.5–5.2)
PROT SERPL-MCNC: 7 G/DL (ref 6–8.5)
RBC # BLD AUTO: 4.37 X10E6/UL (ref 3.77–5.28)
SODIUM SERPL-SCNC: 142 MMOL/L (ref 134–144)
TSH SERPL DL<=0.005 MIU/L-ACNC: 0.43 UIU/ML (ref 0.45–4.5)
WBC # BLD AUTO: 5.7 X10E3/UL (ref 3.4–10.8)

## 2022-06-28 ENCOUNTER — HOSPITAL ENCOUNTER (OUTPATIENT)
Dept: MRI IMAGING | Facility: HOSPITAL | Age: 77
Discharge: HOME | End: 2022-06-28
Admitting: FAMILY MEDICINE

## 2022-06-28 DIAGNOSIS — G89.29 CHRONIC LEFT-SIDED LOW BACK PAIN WITH LEFT-SIDED SCIATICA: ICD-10-CM

## 2022-06-28 DIAGNOSIS — M54.42 CHRONIC LEFT-SIDED LOW BACK PAIN WITH LEFT-SIDED SCIATICA: ICD-10-CM

## 2022-06-28 PROCEDURE — 72148 MRI LUMBAR SPINE W/O DYE: CPT

## 2022-06-30 DIAGNOSIS — M47.27 SPONDYLOSIS OF LUMBOSACRAL SPINE AT MULTIPLE LEVELS WITH RADICULOPATHY: ICD-10-CM

## 2022-06-30 DIAGNOSIS — M51.36 DDD (DEGENERATIVE DISC DISEASE), LUMBAR: Primary | ICD-10-CM

## 2022-08-25 ENCOUNTER — OFFICE VISIT (OUTPATIENT)
Dept: FAMILY MEDICINE CLINIC | Facility: CLINIC | Age: 77
End: 2022-08-25

## 2022-08-25 VITALS
HEIGHT: 65 IN | SYSTOLIC BLOOD PRESSURE: 154 MMHG | HEART RATE: 83 BPM | WEIGHT: 208.8 LBS | DIASTOLIC BLOOD PRESSURE: 54 MMHG | OXYGEN SATURATION: 99 % | BODY MASS INDEX: 34.79 KG/M2 | TEMPERATURE: 97.5 F

## 2022-08-25 DIAGNOSIS — E06.3 HYPOTHYROIDISM DUE TO HASHIMOTO'S THYROIDITIS: ICD-10-CM

## 2022-08-25 DIAGNOSIS — E03.8 HYPOTHYROIDISM DUE TO HASHIMOTO'S THYROIDITIS: ICD-10-CM

## 2022-08-25 DIAGNOSIS — Z79.899 ENCOUNTER FOR LONG-TERM (CURRENT) USE OF MEDICATIONS: ICD-10-CM

## 2022-08-25 DIAGNOSIS — Z13.220 ENCOUNTER FOR LIPID SCREENING FOR CARDIOVASCULAR DISEASE: ICD-10-CM

## 2022-08-25 DIAGNOSIS — I10 BENIGN ESSENTIAL HYPERTENSION: ICD-10-CM

## 2022-08-25 DIAGNOSIS — Z00.00 MEDICARE ANNUAL WELLNESS VISIT, SUBSEQUENT: Primary | ICD-10-CM

## 2022-08-25 DIAGNOSIS — Z13.6 ENCOUNTER FOR LIPID SCREENING FOR CARDIOVASCULAR DISEASE: ICD-10-CM

## 2022-08-25 PROCEDURE — 1159F MED LIST DOCD IN RCRD: CPT | Performed by: FAMILY MEDICINE

## 2022-08-25 PROCEDURE — 99213 OFFICE O/P EST LOW 20 MIN: CPT | Performed by: FAMILY MEDICINE

## 2022-08-25 PROCEDURE — 96160 PT-FOCUSED HLTH RISK ASSMT: CPT | Performed by: FAMILY MEDICINE

## 2022-08-25 PROCEDURE — G0439 PPPS, SUBSEQ VISIT: HCPCS | Performed by: FAMILY MEDICINE

## 2022-08-25 PROCEDURE — 1170F FXNL STATUS ASSESSED: CPT | Performed by: FAMILY MEDICINE

## 2022-08-25 RX ORDER — VALSARTAN 160 MG/1
160 TABLET ORAL DAILY
Qty: 90 TABLET | Refills: 0 | Status: SHIPPED | OUTPATIENT
Start: 2022-08-25 | End: 2022-11-21

## 2022-08-25 NOTE — PROGRESS NOTES
The ABCs of the Annual Wellness Visit  Subsequent Medicare Wellness Visit    Chief Complaint   Patient presents with   • Medicare Wellness-subsequent   • Hypertension     Bp meds making her dizzy and feel bad       Patient is also here to follow up on her chronic medical problems:    Patient was diagnosed with hypertension in the year 2000.  She currently takes valsartan 160 mg daily (1/2 tab of the 320mg dose)  Blood pressures are well controlled at home 110s to 120/70.  But here are elevated.  We attempted to increase the valsartan to 320 mg a few months ago but the patient became very dizzy and lightheaded and noticed her blood pressures were very low (100/60).  So She went back to the 160 mg dose..    Hashimoto's thyroiditis in 1977.  She then had a L subtotal thyroidectomy,  in 2005 and then the right side was removed in 2018.  She currently takes levothyroxine 125 mcg daily.  Thyroid is stable    GERD.  Seen by GI for colonoscopy in January 2022.  Was started on omeprazole 20 mg daily for reflux.  The patient states that her heartburn is much better.  She is also trying to avoid foods that trigger her heartburn.    Patient is also here to discuss her lumbar back pain.  This has been a chronic problem for the past 6 years.  She has had some intermittent flareups.  Her current flareup has been present for about 4 months.  She has tried physical therapy and chiropractic manipulation and neither have been helpful.  She was unable to tolerate many of the physical therapy exercises increased pain.  Patient denies any loss of continence of bowel or bladder.  She also denies any saddle numbness.  However she does state that her left anterior thigh is slightly numb and the location of the referred pain in that area as well.      Subjective    History of Present Illness:  Mer Ramos is a 77 y.o. female who presents for a Subsequent Medicare Wellness Visit.    The following portions of the patient's history were  reviewed and   updated as appropriate: allergies, current medications, past family history, past medical history, past social history, past surgical history and problem list.    Compared to one year ago, the patient feels her physical   health is the same.    Compared to one year ago, the patient feels her mental   health is the same.    Recent Hospitalizations:  She was not admitted to the hospital during the last year.       Current Medical Providers:  Patient Care Team:  Jeannette Merritt DO as PCP - General (Family Medicine)  Poli Mcfarland MD as Surgeon (General Surgery)    Outpatient Medications Prior to Visit   Medication Sig Dispense Refill   • acetaminophen (TYLENOL) 500 MG tablet Take 1,000 mg by mouth Every 6 (Six) Hours As Needed for Mild Pain .     • hydrOXYzine (ATARAX) 25 MG tablet Take 1 tablet by mouth Every 8 (Eight) Hours As Needed for Anxiety. 30 tablet 2   • levothyroxine (Euthyrox) 125 MCG tablet Take 1 tablet by mouth Daily. 90 tablet 1   • nitrofurantoin, macrocrystal-monohydrate, (MACROBID) 100 MG capsule Take 1 capsule by mouth 2 (Two) Times a Day for 7 days. 14 capsule 0   • omeprazole (priLOSEC) 20 MG capsule      • valsartan (DIOVAN) 320 MG tablet Take 1 tablet by mouth Daily. (Patient taking differently: Take 320 mg by mouth Daily. Taking 1/2 tablets) 90 tablet 1     No facility-administered medications prior to visit.       No opioid medication identified on active medication list. I have reviewed chart for other potential  high risk medication/s and harmful drug interactions in the elderly.          Aspirin is not on active medication list.  Aspirin use is not indicated based on review of current medical condition/s. Risk of harm outweighs potential benefits.  .    Patient Active Problem List   Diagnosis   • Benign essential hypertension   • Chest pain   • Hyperlipidemia   • Statin intolerance   • Calculus of gallbladder without cholecystitis without obstruction   • Reflux  "esophagitis   • Vertigo   • Abnormal gait   • Acute frontal sinusitis   • Acute upper respiratory infection   • Allergic reaction   • Allergic rhinitis   • Anxiety   • Osteoarthritis   • At risk for falls   • Compression injury of nerve   • Neuropathy   • Edema of both lower extremities   • Fatigue   • Gastroesophageal reflux disease   • History of cholecystectomy   • History of thyroidectomy   • Hypokalemia   • Impairment of balance   • Influenza-like symptoms   • Laceration of lower limb   • Low back pain   • Lumbosacral radiculopathy   • Multiple gastric ulcers   • Nutritional anemia, unspecified   • Pain in joint   • Polyp of colon   • Rash   • Shortness of breath   • Urinary tract infection   • Essential hypertension   • Abdominal pain   • Depressive disorder   • Hypothyroidism   • Encounter for screening for malignant neoplasm of colon   • Personal history of colonic polyps     Advance Care Planning  Advance Directive is not on file.  ACP discussion was held with the patient during this visit. Patient does not have an advance directive, information provided.          Objective    Vitals:    08/25/22 1045   BP: 154/54   Pulse: 83   Temp: 97.5 °F (36.4 °C)   SpO2: 99%   Weight: 94.7 kg (208 lb 12.8 oz)   Height: 165.1 cm (65\")     Estimated body mass index is 34.75 kg/m² as calculated from the following:    Height as of this encounter: 165.1 cm (65\").    Weight as of this encounter: 94.7 kg (208 lb 12.8 oz).    BMI is >= 30 and <35. (Class 1 Obesity). The following options were offered after discussion;: exercise counseling/recommendations and nutrition counseling/recommendations      Does the patient have evidence of cognitive impairment? No    Physical Exam  Vitals and nursing note reviewed.   Constitutional:       Appearance: She is well-developed.   HENT:      Head: Normocephalic and atraumatic.      Right Ear: External ear normal.      Left Ear: External ear normal.      Nose: Nose normal.   Eyes:      " General: No scleral icterus.     Conjunctiva/sclera: Conjunctivae normal.   Cardiovascular:      Rate and Rhythm: Normal rate and regular rhythm.      Heart sounds: Normal heart sounds.   Pulmonary:      Effort: Pulmonary effort is normal.      Breath sounds: Normal breath sounds.   Musculoskeletal:      Cervical back: Normal range of motion and neck supple.   Lymphadenopathy:      Cervical: No cervical adenopathy.   Skin:     General: Skin is warm and dry.      Findings: No rash.   Neurological:      Mental Status: She is alert and oriented to person, place, and time.   Psychiatric:         Behavior: Behavior normal.         Thought Content: Thought content normal.         Judgment: Judgment normal.                 HEALTH RISK ASSESSMENT    Smoking Status:  Social History     Tobacco Use   Smoking Status Never Smoker   Smokeless Tobacco Never Used     Alcohol Consumption:  Social History     Substance and Sexual Activity   Alcohol Use Yes    Comment: rare     Fall Risk Screen:    SORENADI Fall Risk Assessment was completed, and patient is at HIGH risk for falls. Assessment completed on:8/25/2022    Depression Screening:  PHQ-2/PHQ-9 Depression Screening 8/25/2022   Retired Total Score -   Little Interest or Pleasure in Doing Things 0-->not at all   Feeling Down, Depressed or Hopeless 0-->not at all   PHQ-9: Brief Depression Severity Measure Score 0       Health Habits and Functional and Cognitive Screening:  Functional & Cognitive Status 8/25/2022   Do you have difficulty preparing food and eating? No   Do you have difficulty bathing yourself, getting dressed or grooming yourself? No   Do you have difficulty using the toilet? No   Do you have difficulty moving around from place to place? No   Do you have trouble with steps or getting out of a bed or a chair? Yes   Current Diet Well Balanced Diet   Dental Exam Up to date   Eye Exam Up to date   Exercise (times per week) 0 times per week   Current Exercises Include No  Regular Exercise   Do you need help using the phone?  No   Are you deaf or do you have serious difficulty hearing?  Yes   Do you need help with transportation? Yes   Do you need help shopping? No   Do you need help preparing meals?  No   Do you need help with housework?  No   Do you need help with laundry? No   Do you need help taking your medications? No   Do you need help managing money? No   Do you ever drive or ride in a car without wearing a seat belt? No   Have you felt unusual stress, anger or loneliness in the last month? -   Who do you live with? -   If you need help, do you have trouble finding someone available to you? -   Have you been bothered in the last four weeks by sexual problems? -   Do you have difficulty concentrating, remembering or making decisions? -       Age-appropriate Screening Schedule:  Refer to the list below for future screening recommendations based on patient's age, sex and/or medical conditions. Orders for these recommended tests are listed in the plan section. The patient has been provided with a written plan.    Health Maintenance   Topic Date Due   • TDAP/TD VACCINES (1 - Tdap) Never done   • ZOSTER VACCINE (1 of 2) Never done   • LIPID PANEL  04/08/2022   • DXA SCAN  08/01/2023 (Originally 7/12/2020)   • INFLUENZA VACCINE  10/01/2022   • MAMMOGRAM  12/02/2022              Assessment & Plan   CMS Preventative Services Quick Reference  Risk Factors Identified During Encounter  Immunizations Discussed/Encouraged (specific Immunizations; Influenza, Prevnar 20 (Pneumococcal 20-valent conjugate), Shingrix and COVID19  Obesity/Overweight   The above risks/problems have been discussed with the patient.  Follow up actions/plans if indicated are seen below in the Assessment/Plan Section.  Pertinent information has been shared with the patient in the After Visit Summary.    Diagnoses and all orders for this visit:    1. Medicare annual wellness visit, subsequent (Primary)    2. Benign  essential hypertension  -     valsartan (DIOVAN) 160 MG tablet; Take 1 tablet by mouth Daily.  Dispense: 90 tablet; Refill: 0    3. Hypothyroidism due to Hashimoto's thyroiditis  -     TSH    4. Encounter for long-term (current) use of medications  -     Lipid Panel  -     TSH    5. Encounter for lipid screening for cardiovascular disease  -     Lipid Panel    Routine health maintenance.  Patient is up-to-date on all routine health screenings.    Uncontrolled hypertension.  The patient's blood pressure logs at home are well controlled however she has higher readings here in the office.  The patient felt very poor on a higher dose of valsartan so I would like her to stay on the 160 mg dose at this time.  Surveillance labs were obtained today and any medication changes will be made based on lab results and will be called to the patient later this week.    Hypothyroidism.  Recheck TSH today since her dose of levothyroxine was adjusted last time.     Follow Up:   Return in about 3 months (around 11/25/2022) for HTN, Thyroid.     An After Visit Summary and PPPS were made available to the patient.

## 2022-08-26 ENCOUNTER — TELEPHONE (OUTPATIENT)
Dept: FAMILY MEDICINE CLINIC | Facility: CLINIC | Age: 77
End: 2022-08-26

## 2022-08-26 DIAGNOSIS — E03.8 HYPOTHYROIDISM DUE TO HASHIMOTO'S THYROIDITIS: ICD-10-CM

## 2022-08-26 DIAGNOSIS — E06.3 HYPOTHYROIDISM DUE TO HASHIMOTO'S THYROIDITIS: ICD-10-CM

## 2022-08-26 LAB
CHOLEST SERPL-MCNC: 208 MG/DL (ref 100–199)
HDLC SERPL-MCNC: 37 MG/DL
LDLC SERPL CALC-MCNC: 127 MG/DL (ref 0–99)
TRIGL SERPL-MCNC: 250 MG/DL (ref 0–149)
TSH SERPL DL<=0.005 MIU/L-ACNC: 0.43 UIU/ML (ref 0.45–4.5)
VLDLC SERPL CALC-MCNC: 44 MG/DL (ref 5–40)

## 2022-08-26 RX ORDER — LEVOTHYROXINE SODIUM 112 UG/1
112 TABLET ORAL DAILY
Qty: 90 TABLET | Refills: 1 | Status: SHIPPED | OUTPATIENT
Start: 2022-08-26 | End: 2023-02-27

## 2022-08-26 NOTE — TELEPHONE ENCOUNTER
Caller: Mer Ramos    Relationship: Self    Best call back number: [phone]    Caller requesting test results: PATIENT    What test was performed: LABS    When was the test performed: 8/25/22    Where was the test performed OFFICE

## 2022-11-07 ENCOUNTER — TELEPHONE (OUTPATIENT)
Dept: FAMILY MEDICINE CLINIC | Facility: CLINIC | Age: 77
End: 2022-11-07

## 2022-11-21 DIAGNOSIS — I10 BENIGN ESSENTIAL HYPERTENSION: ICD-10-CM

## 2022-11-21 RX ORDER — VALSARTAN 160 MG/1
TABLET ORAL
Qty: 90 TABLET | Refills: 0 | Status: SHIPPED | OUTPATIENT
Start: 2022-11-21 | End: 2023-01-05 | Stop reason: SDUPTHER

## 2023-01-05 ENCOUNTER — OFFICE VISIT (OUTPATIENT)
Dept: FAMILY MEDICINE CLINIC | Facility: CLINIC | Age: 78
End: 2023-01-05
Payer: MEDICARE

## 2023-01-05 VITALS
WEIGHT: 211.2 LBS | SYSTOLIC BLOOD PRESSURE: 154 MMHG | HEART RATE: 70 BPM | OXYGEN SATURATION: 96 % | BODY MASS INDEX: 36.06 KG/M2 | DIASTOLIC BLOOD PRESSURE: 90 MMHG | HEIGHT: 64 IN | TEMPERATURE: 97.7 F

## 2023-01-05 DIAGNOSIS — Z79.899 ENCOUNTER FOR LONG-TERM (CURRENT) USE OF MEDICATIONS: ICD-10-CM

## 2023-01-05 DIAGNOSIS — K21.9 GASTROESOPHAGEAL REFLUX DISEASE WITHOUT ESOPHAGITIS: ICD-10-CM

## 2023-01-05 DIAGNOSIS — R30.0 DYSURIA: ICD-10-CM

## 2023-01-05 DIAGNOSIS — R42 VERTIGO: ICD-10-CM

## 2023-01-05 DIAGNOSIS — E06.3 HYPOTHYROIDISM DUE TO HASHIMOTO'S THYROIDITIS: ICD-10-CM

## 2023-01-05 DIAGNOSIS — E03.8 HYPOTHYROIDISM DUE TO HASHIMOTO'S THYROIDITIS: ICD-10-CM

## 2023-01-05 DIAGNOSIS — I10 BENIGN ESSENTIAL HYPERTENSION: Primary | ICD-10-CM

## 2023-01-05 LAB
ALBUMIN SERPL-MCNC: 4.4 G/DL (ref 3.5–5.2)
ALBUMIN/GLOB SERPL: 1.5 G/DL
ALP SERPL-CCNC: 37 U/L (ref 39–117)
ALT SERPL-CCNC: 10 U/L (ref 1–33)
AST SERPL-CCNC: 16 U/L (ref 1–32)
BASOPHILS # BLD AUTO: 0.05 10*3/MM3 (ref 0–0.2)
BASOPHILS NFR BLD AUTO: 0.9 % (ref 0–1.5)
BILIRUB BLD-MCNC: NEGATIVE MG/DL
BILIRUB SERPL-MCNC: 0.5 MG/DL (ref 0–1.2)
BUN SERPL-MCNC: 12 MG/DL (ref 8–23)
BUN/CREAT SERPL: 14.1 (ref 7–25)
CALCIUM SERPL-MCNC: 9.1 MG/DL (ref 8.6–10.5)
CHLORIDE SERPL-SCNC: 103 MMOL/L (ref 98–107)
CHOLEST SERPL-MCNC: 212 MG/DL (ref 0–200)
CO2 SERPL-SCNC: 28.5 MMOL/L (ref 22–29)
CREAT SERPL-MCNC: 0.85 MG/DL (ref 0.57–1)
EGFRCR SERPLBLD CKD-EPI 2021: 71.1 ML/MIN/1.73
EOSINOPHIL # BLD AUTO: 0.26 10*3/MM3 (ref 0–0.4)
EOSINOPHIL NFR BLD AUTO: 4.8 % (ref 0.3–6.2)
ERYTHROCYTE [DISTWIDTH] IN BLOOD BY AUTOMATED COUNT: 14 % (ref 12.3–15.4)
EXPIRATION DATE: ABNORMAL
GLOBULIN SER CALC-MCNC: 3 GM/DL
GLUCOSE SERPL-MCNC: 92 MG/DL (ref 65–99)
GLUCOSE UR STRIP-MCNC: NEGATIVE MG/DL
HCT VFR BLD AUTO: 36.8 % (ref 34–46.6)
HDLC SERPL-MCNC: 35 MG/DL (ref 40–60)
HGB BLD-MCNC: 12.2 G/DL (ref 12–15.9)
IMM GRANULOCYTES # BLD AUTO: 0.02 10*3/MM3 (ref 0–0.05)
IMM GRANULOCYTES NFR BLD AUTO: 0.4 % (ref 0–0.5)
KETONES UR QL: NEGATIVE
LDLC SERPL CALC-MCNC: 144 MG/DL (ref 0–100)
LEUKOCYTE EST, POC: ABNORMAL
LYMPHOCYTES # BLD AUTO: 1.52 10*3/MM3 (ref 0.7–3.1)
LYMPHOCYTES NFR BLD AUTO: 27.8 % (ref 19.6–45.3)
Lab: ABNORMAL
MCH RBC QN AUTO: 27.9 PG (ref 26.6–33)
MCHC RBC AUTO-ENTMCNC: 33.2 G/DL (ref 31.5–35.7)
MCV RBC AUTO: 84.2 FL (ref 79–97)
MONOCYTES # BLD AUTO: 0.47 10*3/MM3 (ref 0.1–0.9)
MONOCYTES NFR BLD AUTO: 8.6 % (ref 5–12)
NEUTROPHILS # BLD AUTO: 3.14 10*3/MM3 (ref 1.7–7)
NEUTROPHILS NFR BLD AUTO: 57.5 % (ref 42.7–76)
NITRITE UR-MCNC: NEGATIVE MG/ML
NRBC BLD AUTO-RTO: 0 /100 WBC (ref 0–0.2)
PH UR: 65 [PH] (ref 5–8)
PLATELET # BLD AUTO: 157 10*3/MM3 (ref 140–450)
POTASSIUM SERPL-SCNC: 4 MMOL/L (ref 3.5–5.2)
PROT SERPL-MCNC: 7.4 G/DL (ref 6–8.5)
PROT UR STRIP-MCNC: NEGATIVE MG/DL
RBC # BLD AUTO: 4.37 10*6/MM3 (ref 3.77–5.28)
RBC # UR STRIP: ABNORMAL /UL
SODIUM SERPL-SCNC: 138 MMOL/L (ref 136–145)
SP GR UR: 1.01 (ref 1–1.03)
TRIGL SERPL-MCNC: 180 MG/DL (ref 0–150)
TSH SERPL DL<=0.005 MIU/L-ACNC: 0.72 UIU/ML (ref 0.27–4.2)
UROBILINOGEN UR QL: NORMAL
VLDLC SERPL CALC-MCNC: 33 MG/DL (ref 5–40)
WBC # BLD AUTO: 5.46 10*3/MM3 (ref 3.4–10.8)

## 2023-01-05 PROCEDURE — 81003 URINALYSIS AUTO W/O SCOPE: CPT | Performed by: FAMILY MEDICINE

## 2023-01-05 PROCEDURE — 99214 OFFICE O/P EST MOD 30 MIN: CPT | Performed by: FAMILY MEDICINE

## 2023-01-05 RX ORDER — VALSARTAN 160 MG/1
240 TABLET ORAL DAILY
Qty: 135 TABLET | Refills: 0 | Status: SHIPPED | OUTPATIENT
Start: 2023-01-05 | End: 2023-02-22

## 2023-01-05 RX ORDER — MECLIZINE HYDROCHLORIDE 25 MG/1
25 TABLET ORAL NIGHTLY PRN
Qty: 90 TABLET | Refills: 0 | Status: SHIPPED | OUTPATIENT
Start: 2023-01-05

## 2023-01-05 NOTE — PROGRESS NOTES
Chief Complaint  Hypothyroidism, Hypertension, Follow-up (Uti follow up), and Dizziness (Not walking well)    Subjective        Mer Ramos presents to [facility]  History of Present Illness  Patient is also here to follow up on her chronic medical problems:    Hypertension.   Diagnosed in the year 2000.  She currently takes valsartan 160 mg daily.  Blood pressures are elevated in the office today.  We attempted to increase the valsartan to 320 mg a few months ago but the patient became very dizzy and lightheaded and noticed her blood pressures were very low (100/60).  So She went back to the 160 mg dose.    Hashimoto's thyroiditis.  Diagnosed in 1977.  She then had a L subtotal thyroidectomy, in 2005 and then the right side was removed in 2018.  She currently takes levothyroxine 112 mcg daily.  Thyroid is stable    GERD.  Seen by GI for colonoscopy in January 2022.  Was started on omeprazole 20 mg daily for reflux.  The patient states that her heartburn is much better.  She is also trying to avoid foods that trigger her heartburn.    Patient is also here for follow-up of a UTI.  She states that the last time she went to the urgent care center in December and took several days for her symptoms to improve.  She just wants to recheck her urinalysis.     Patient has also been having flares of vertigo.  She has seen Dr. Ro in the past who has helped acutely with her symptoms.  She denies any new symptoms.      Objective   Vital Signs:  /90   Pulse 70   Temp 97.7 °F (36.5 °C)   Ht 162.6 cm (64.02\")   Wt 95.8 kg (211 lb 3.2 oz)   SpO2 96%   BMI 36.23 kg/m²   Estimated body mass index is 36.23 kg/m² as calculated from the following:    Height as of this encounter: 162.6 cm (64.02\").    Weight as of this encounter: 95.8 kg (211 lb 3.2 oz).          Physical Exam  Vitals and nursing note reviewed.   Constitutional:       Appearance: Normal appearance. She is well-developed.    HENT:      Head: Normocephalic and atraumatic.      Right Ear: External ear normal.      Left Ear: External ear normal.      Nose: Nose normal.   Eyes:      General: No scleral icterus.     Conjunctiva/sclera: Conjunctivae normal.   Cardiovascular:      Rate and Rhythm: Normal rate and regular rhythm.      Heart sounds: Normal heart sounds.   Pulmonary:      Effort: Pulmonary effort is normal.      Breath sounds: Normal breath sounds.   Musculoskeletal:      Cervical back: Normal range of motion and neck supple.   Lymphadenopathy:      Cervical: No cervical adenopathy.   Skin:     General: Skin is warm and dry.      Findings: No rash.   Neurological:      Mental Status: She is alert and oriented to person, place, and time.   Psychiatric:         Mood and Affect: Mood normal.         Behavior: Behavior normal.         Thought Content: Thought content normal.         Judgment: Judgment normal.        Result Review :  The following data was reviewed by: Jeannette Merritt DO on 01/05/2023:  Common labs    Common Labs 5/24/22 6/13/22 6/13/22 8/25/22     1631 1631    Glucose 93  93    BUN 12  10    Creatinine 0.86  0.88    Sodium 142  142    Potassium 4.1  4.3    Chloride 104  103    Calcium 9.6  9.5    Total Protein 7.2  7.0    Albumin 4.4  4.2    Total Bilirubin 0.3  0.3    Alkaline Phosphatase 33 (A)  39 (A)    AST (SGOT) 14  13    ALT (SGPT) 11  9    WBC  5.7     Hemoglobin  12.5     Hematocrit  36.8     Platelets  168     Total Cholesterol    208 (A)   Triglycerides    250 (A)   HDL Cholesterol    37 (A)   LDL Cholesterol     127 (A)   (A) Abnormal value            TSH    TSH 5/24/22 6/13/22 8/25/22   TSH 0.628 0.434 (A) 0.432 (A)   (A) Abnormal value                      Assessment and Plan   Diagnoses and all orders for this visit:    1. Benign essential hypertension (Primary)  -     valsartan (DIOVAN) 160 MG tablet; Take 1.5 tablets by mouth Daily.  Dispense: 135 tablet; Refill: 0  -     Comprehensive Metabolic  Panel    2. Hypothyroidism due to Hashimoto's thyroiditis  -     TSH    3. Gastroesophageal reflux disease without esophagitis  -     CBC & Differential    4. Encounter for long-term (current) use of medications  -     TSH  -     CBC & Differential  -     Comprehensive Metabolic Panel  -     Lipid Panel    5. Dysuria  -     POC Urinalysis Dipstick, Automated  -     Urine Culture - Urine, Urine, Clean Catch    6. Vertigo  -     meclizine (ANTIVERT) 25 MG tablet; Take 1 tablet by mouth At Night As Needed for Dizziness (or sleep).  Dispense: 90 tablet; Refill: 0    Patient was seen today for chronic stable hypothyroidism and GERD. Surveillance labs were obtained today and any medication changes will be made based on lab results and will be called to the patient later this week.  Patient is also here for follow-up of a UTI.  She states that the last time she went to the urgent care center in December and took several days for her symptoms to improve.  She just wants to recheck her urinalysis.  It is showing blood and leukocytes but since the patient is not having significant symptoms I would like to wait on the urine culture before treating.  Vertigo.  The patient was advised to follow-up with Dr. Ro.  I have changed the hydroxyzine which she used to use for sleep or anxiety to meclizine and advised her to try it instead.  If her symptoms worsen she was advised to follow-up.  Uncontrolled hypertension.  The patient was advised to increase the valsartan to 1-1/2 tablets daily (240 mg).  If her blood pressures are still elevated at home she was advised to follow-up.         Follow Up   Return in about 3 months (around 4/12/2023) for HTN, Thyroid.  Patient was given instructions and counseling regarding her condition or for health maintenance advice. Please see specific information pulled into the AVS if appropriate.

## 2023-01-07 LAB
BACTERIA UR CULT: NORMAL
BACTERIA UR CULT: NORMAL

## 2023-02-26 DIAGNOSIS — E06.3 HYPOTHYROIDISM DUE TO HASHIMOTO'S THYROIDITIS: ICD-10-CM

## 2023-02-26 DIAGNOSIS — E03.8 HYPOTHYROIDISM DUE TO HASHIMOTO'S THYROIDITIS: ICD-10-CM

## 2023-02-27 RX ORDER — LEVOTHYROXINE SODIUM 112 UG/1
TABLET ORAL
Qty: 90 TABLET | Refills: 0 | Status: SHIPPED | OUTPATIENT
Start: 2023-02-27

## 2023-03-13 ENCOUNTER — OFFICE VISIT (OUTPATIENT)
Dept: FAMILY MEDICINE CLINIC | Facility: CLINIC | Age: 78
End: 2023-03-13
Payer: MEDICARE

## 2023-03-13 VITALS
BODY MASS INDEX: 41.7 KG/M2 | OXYGEN SATURATION: 96 % | WEIGHT: 212.4 LBS | HEART RATE: 86 BPM | DIASTOLIC BLOOD PRESSURE: 81 MMHG | SYSTOLIC BLOOD PRESSURE: 131 MMHG | TEMPERATURE: 97.7 F | HEIGHT: 60 IN

## 2023-03-13 DIAGNOSIS — R31.9 URINARY TRACT INFECTION WITH HEMATURIA, SITE UNSPECIFIED: ICD-10-CM

## 2023-03-13 DIAGNOSIS — R35.0 URINE FREQUENCY: Primary | ICD-10-CM

## 2023-03-13 DIAGNOSIS — N39.0 URINARY TRACT INFECTION WITH HEMATURIA, SITE UNSPECIFIED: ICD-10-CM

## 2023-03-13 LAB
BILIRUB BLD-MCNC: NEGATIVE MG/DL
CLARITY, POC: ABNORMAL
COLOR UR: YELLOW
EXPIRATION DATE: ABNORMAL
GLUCOSE UR STRIP-MCNC: NEGATIVE MG/DL
KETONES UR QL: NEGATIVE
LEUKOCYTE EST, POC: ABNORMAL
Lab: ABNORMAL
NITRITE UR-MCNC: NEGATIVE MG/ML
PH UR: 6 [PH] (ref 5–8)
PROT UR STRIP-MCNC: NEGATIVE MG/DL
RBC # UR STRIP: ABNORMAL /UL
SP GR UR: 1.01 (ref 1–1.03)
UROBILINOGEN UR QL: NORMAL

## 2023-03-13 PROCEDURE — 3075F SYST BP GE 130 - 139MM HG: CPT | Performed by: FAMILY MEDICINE

## 2023-03-13 PROCEDURE — 1160F RVW MEDS BY RX/DR IN RCRD: CPT | Performed by: FAMILY MEDICINE

## 2023-03-13 PROCEDURE — 1159F MED LIST DOCD IN RCRD: CPT | Performed by: FAMILY MEDICINE

## 2023-03-13 PROCEDURE — 3079F DIAST BP 80-89 MM HG: CPT | Performed by: FAMILY MEDICINE

## 2023-03-13 PROCEDURE — 81003 URINALYSIS AUTO W/O SCOPE: CPT | Performed by: FAMILY MEDICINE

## 2023-03-13 PROCEDURE — 99213 OFFICE O/P EST LOW 20 MIN: CPT | Performed by: FAMILY MEDICINE

## 2023-03-13 RX ORDER — CEFDINIR 300 MG/1
300 CAPSULE ORAL 2 TIMES DAILY
Qty: 10 CAPSULE | Refills: 0 | Status: SHIPPED | OUTPATIENT
Start: 2023-03-13

## 2023-03-13 NOTE — PROGRESS NOTES
"Chief Complaint  bladder spasms and Urinary Frequency    Subjective        Mer Ramos presents to [facility]  History of Present Illness  Patient is here today for urinary symptoms.  She started 2 days ago with bladder spasms and urgency of urine.  She denies any back pain or fevers.  She was recently treated for a UTI with Macrobid in February.  She took all of the antibiotic.  She felt that that infection fully improved until 2 days ago.      Objective   Vital Signs:  /81   Pulse 86   Temp 97.7 °F (36.5 °C)   Ht 152.4 cm (60\")   Wt 96.3 kg (212 lb 6.4 oz)   SpO2 96%   BMI 41.48 kg/m²   Estimated body mass index is 41.48 kg/m² as calculated from the following:    Height as of this encounter: 152.4 cm (60\").    Weight as of this encounter: 96.3 kg (212 lb 6.4 oz).             Physical Exam  Vitals and nursing note reviewed.   Constitutional:       Appearance: Normal appearance. She is well-developed. She is obese.   HENT:      Head: Normocephalic and atraumatic.      Right Ear: External ear normal.      Left Ear: External ear normal.      Nose: Nose normal.   Eyes:      General: No scleral icterus.     Conjunctiva/sclera: Conjunctivae normal.   Cardiovascular:      Rate and Rhythm: Normal rate and regular rhythm.      Heart sounds: Normal heart sounds.   Pulmonary:      Effort: Pulmonary effort is normal.      Breath sounds: Normal breath sounds.   Abdominal:      Tenderness: There is no right CVA tenderness or left CVA tenderness.   Musculoskeletal:      Cervical back: Normal range of motion and neck supple.      Right lower leg: No edema.      Left lower leg: No edema.   Lymphadenopathy:      Cervical: No cervical adenopathy.   Skin:     General: Skin is warm and dry.      Findings: No rash.   Neurological:      Mental Status: She is alert and oriented to person, place, and time.   Psychiatric:         Mood and Affect: Mood normal.         Behavior: Behavior normal.  "        Thought Content: Thought content normal.         Judgment: Judgment normal.        Result Review :  The following data was reviewed by: Jeannette Merritt DO on 03/13/2023:  Common labs    Common Labs 6/13/22 6/13/22 8/25/22 1/5/23 1/5/23 1/5/23    1631 1631  1040 1040 1040   Glucose  93   92    BUN  10   12    Creatinine  0.88   0.85    Sodium  142   138    Potassium  4.3   4.0    Chloride  103   103    Calcium  9.5   9.1    Total Protein  7.0   7.4    Albumin  4.2   4.4    Total Bilirubin  0.3   0.5    Alkaline Phosphatase  39 (A)   37 (A)    AST (SGOT)  13   16    ALT (SGPT)  9   10    WBC 5.7   5.46     Hemoglobin 12.5   12.2     Hematocrit 36.8   36.8     Platelets 168   157     Total Cholesterol   208 (A)   212 (A)   Triglycerides   250 (A)   180 (A)   HDL Cholesterol   37 (A)   35 (A)   LDL Cholesterol    127 (A)   144 (A)   (A) Abnormal value       Comments are available for some flowsheets but are not being displayed.           UA    Urinalysis 1/5/23 2/19/23 3/13/23   Ketones, UA Negative Negative Negative   Leukocytes, UA Moderate (2+) (A) Small (1+) (A) Trace (A)   (A) Abnormal value            Urine Culture    Urine Culture 12/17/22 1/5/23 2/19/23   Urine Culture Final report Final report Final report                        Assessment and Plan   Diagnoses and all orders for this visit:    1. Urine frequency (Primary)  -     POC Urinalysis Dipstick, Automated  -     Urine Culture - Urine, Urine, Clean Catch    2. Urinary tract infection with hematuria, site unspecified  -     cefdinir (OMNICEF) 300 MG capsule; Take 1 capsule by mouth 2 (Two) Times a Day.  Dispense: 10 capsule; Refill: 0    Patient is seen today for form urinary frequency and bladder pressure.  The patient has had similar symptoms over the last several months intermittently.  She has been given antibiotics for the symptoms but most of her urine cultures have come back without bacterial growth.  Patient always improves after  "antibiotic use.  Patient was seen by a urologist who performed a cystoscopy and stated that her \"bladder looked normal\".  I had a long discussion with the patient regarding caffeine.  She usually drinks 2 cups of coffee every morning.  I advised her to change to decaf and then slowly taper off of decaf.  The patient is very resistant because she \"loves her coffee\" but she will try to cut back.  I will go ahead and treat her for a urinary tract infection with Omnicef.  Urine culture was sent and the patient will be notified of those results when they are available.         Follow Up   Return if symptoms worsen or fail to improve.  Patient was given instructions and counseling regarding her condition or for health maintenance advice. Please see specific information pulled into the AVS if appropriate.       "

## 2023-03-16 LAB
BACTERIA UR CULT: NORMAL
BACTERIA UR CULT: NORMAL

## 2023-03-22 ENCOUNTER — TELEPHONE (OUTPATIENT)
Dept: FAMILY MEDICINE CLINIC | Facility: CLINIC | Age: 78
End: 2023-03-22
Payer: MEDICARE

## 2023-03-22 NOTE — TELEPHONE ENCOUNTER
"    Caller: Mer Ramos \"Alisson\"    Relationship: Self    Best call back number: [phone]    What form or medical record are you requesting: STATEMENT FROM THE PATIENT'S DOCTOR STATING THAT THE PATIENT NEEDS BALANCE BARS BY HER SHOWER.     Who is requesting this form or medical record from you: KRISTIE    How would you like to receive the form or medical records (pick-up, mail, fax): PICK-UP IN OFFICE    Timeframe paperwork needed: TOMORROW MORNING      "

## 2023-04-12 ENCOUNTER — OFFICE VISIT (OUTPATIENT)
Dept: FAMILY MEDICINE CLINIC | Facility: CLINIC | Age: 78
End: 2023-04-12
Payer: MEDICARE

## 2023-04-12 VITALS
HEART RATE: 91 BPM | BODY MASS INDEX: 41.43 KG/M2 | WEIGHT: 211 LBS | DIASTOLIC BLOOD PRESSURE: 74 MMHG | SYSTOLIC BLOOD PRESSURE: 124 MMHG | TEMPERATURE: 97.6 F | OXYGEN SATURATION: 97 % | HEIGHT: 60 IN

## 2023-04-12 DIAGNOSIS — K21.9 GASTROESOPHAGEAL REFLUX DISEASE WITHOUT ESOPHAGITIS: ICD-10-CM

## 2023-04-12 DIAGNOSIS — I10 BENIGN ESSENTIAL HYPERTENSION: ICD-10-CM

## 2023-04-12 DIAGNOSIS — E03.8 HYPOTHYROIDISM DUE TO HASHIMOTO'S THYROIDITIS: Primary | ICD-10-CM

## 2023-04-12 DIAGNOSIS — E06.3 HYPOTHYROIDISM DUE TO HASHIMOTO'S THYROIDITIS: Primary | ICD-10-CM

## 2023-04-12 DIAGNOSIS — Z79.899 ENCOUNTER FOR LONG-TERM (CURRENT) USE OF MEDICATIONS: ICD-10-CM

## 2023-04-12 PROCEDURE — 3074F SYST BP LT 130 MM HG: CPT | Performed by: FAMILY MEDICINE

## 2023-04-12 PROCEDURE — 99214 OFFICE O/P EST MOD 30 MIN: CPT | Performed by: FAMILY MEDICINE

## 2023-04-12 PROCEDURE — 3078F DIAST BP <80 MM HG: CPT | Performed by: FAMILY MEDICINE

## 2023-04-12 PROCEDURE — 1159F MED LIST DOCD IN RCRD: CPT | Performed by: FAMILY MEDICINE

## 2023-04-12 PROCEDURE — 1160F RVW MEDS BY RX/DR IN RCRD: CPT | Performed by: FAMILY MEDICINE

## 2023-04-12 RX ORDER — VALSARTAN 160 MG/1
220 TABLET ORAL DAILY
Qty: 135 TABLET | Refills: 1 | Status: SHIPPED | OUTPATIENT
Start: 2023-04-12

## 2023-04-12 NOTE — PROGRESS NOTES
"Chief Complaint  Hypertension and Hypothyroidism    Subjective        Mer Ramos presents to [facility]  History of Present Illness  Patient is here to follow up on her chronic medical problems:    Hypertension.   Diagnosed in the year 2000.  She currently takes valsartan 160 mg 1.5 tabs daily.  Blood pressures are much better at that dosage.    Hashimoto's thyroiditis.  Diagnosed in 1977.  She then had a L subtotal thyroidectomy, in 2005 and then the right side was removed in 2018.  She currently takes levothyroxine 112 mcg daily.  Thyroid is stable    GERD.  Seen by GI for colonoscopy in January 2022.  Was started on omeprazole 20 mg daily for reflux.  The patient states that her heartburn is much better.  She is also trying to avoid foods that trigger her heartburn.      Objective   Vital Signs:  /74   Pulse 91   Temp 97.6 °F (36.4 °C)   Ht 152.4 cm (60\")   Wt 95.7 kg (211 lb)   SpO2 97%   BMI 41.21 kg/m²   Estimated body mass index is 41.21 kg/m² as calculated from the following:    Height as of this encounter: 152.4 cm (60\").    Weight as of this encounter: 95.7 kg (211 lb).             Physical Exam  Vitals and nursing note reviewed.   Constitutional:       Appearance: She is well-developed.   HENT:      Head: Normocephalic and atraumatic.      Right Ear: External ear normal.      Left Ear: External ear normal.      Nose: Nose normal.   Eyes:      General: No scleral icterus.     Conjunctiva/sclera: Conjunctivae normal.   Cardiovascular:      Rate and Rhythm: Normal rate and regular rhythm.      Heart sounds: Normal heart sounds.   Pulmonary:      Effort: Pulmonary effort is normal.      Breath sounds: Normal breath sounds.   Musculoskeletal:      Cervical back: Normal range of motion and neck supple.   Lymphadenopathy:      Cervical: No cervical adenopathy.   Skin:     General: Skin is warm and dry.      Findings: No rash.   Neurological:      Mental Status: She " is alert and oriented to person, place, and time.   Psychiatric:         Mood and Affect: Mood normal.         Behavior: Behavior normal.         Thought Content: Thought content normal.         Judgment: Judgment normal.        Result Review :  The following data was reviewed by: Jeannette Merritt DO on 04/12/2023:  Common labs        6/13/2022    16:31 8/25/2022    11:24 1/5/2023    10:40   Common Labs   Glucose 93    92     BUN 10    12     Creatinine 0.88    0.85     Sodium 142    138     Potassium 4.3    4.0     Chloride 103    103     Calcium 9.5    9.1     Total Protein 7.0    7.4     Albumin 4.2    4.4     Total Bilirubin 0.3    0.5     Alkaline Phosphatase 39    37     AST (SGOT) 13    16     ALT (SGPT) 9    10     WBC 5.7    5.46     Hemoglobin 12.5    12.2     Hematocrit 36.8    36.8     Platelets 168    157     Total Cholesterol  208   212     Triglycerides  250   180     HDL Cholesterol  37   35     LDL Cholesterol   127   144       TSH        6/13/2022    16:31 8/25/2022    11:24 1/5/2023    10:40   TSH   TSH 0.434   0.432   0.721                    Assessment and Plan   Diagnoses and all orders for this visit:    1. 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 Benign essential hypertension  -     Comprehensive Metabolic Panel  -     valsartan (DIOVAN) 160 MG tablet; Take 1.5 tablets by mouth Daily.  Dispense: 135 tablet; Refill: 1    3. Gastroesophageal reflux disease without esophagitis    4. Encounter for long-term (current) use of medications  -     TSH  -     Comprehensive Metabolic Panel    Patient is here today for chronic stable hypothyroidism, hypertension, and GERD.  Surveillance labs were obtained today and any medication changes will be made based on lab results and will be called to the patient later this week.         Follow Up   Return in about 5 months (around 8/28/2023) for Medicare Wellness, Thyroid, HTN.  Patient was given instructions and counseling  regarding her condition or for health maintenance advice. Please see specific information pulled into the AVS if appropriate.

## 2023-04-13 DIAGNOSIS — E03.8 HYPOTHYROIDISM DUE TO HASHIMOTO'S THYROIDITIS: ICD-10-CM

## 2023-04-13 DIAGNOSIS — E06.3 HYPOTHYROIDISM DUE TO HASHIMOTO'S THYROIDITIS: ICD-10-CM

## 2023-04-13 LAB
ALBUMIN SERPL-MCNC: 4.7 G/DL (ref 3.5–5.2)
ALBUMIN/GLOB SERPL: 2 G/DL
ALP SERPL-CCNC: 38 U/L (ref 39–117)
ALT SERPL-CCNC: 11 U/L (ref 1–33)
AST SERPL-CCNC: 16 U/L (ref 1–32)
BILIRUB SERPL-MCNC: 0.4 MG/DL (ref 0–1.2)
BUN SERPL-MCNC: 7 MG/DL (ref 8–23)
BUN/CREAT SERPL: 8 (ref 7–25)
CALCIUM SERPL-MCNC: 9.6 MG/DL (ref 8.6–10.5)
CHLORIDE SERPL-SCNC: 101 MMOL/L (ref 98–107)
CO2 SERPL-SCNC: 29.1 MMOL/L (ref 22–29)
CREAT SERPL-MCNC: 0.88 MG/DL (ref 0.57–1)
EGFRCR SERPLBLD CKD-EPI 2021: 68.2 ML/MIN/1.73
GLOBULIN SER CALC-MCNC: 2.4 GM/DL
GLUCOSE SERPL-MCNC: 96 MG/DL (ref 65–99)
POTASSIUM SERPL-SCNC: 4.6 MMOL/L (ref 3.5–5.2)
PROT SERPL-MCNC: 7.1 G/DL (ref 6–8.5)
SODIUM SERPL-SCNC: 138 MMOL/L (ref 136–145)
TSH SERPL DL<=0.005 MIU/L-ACNC: 1.35 UIU/ML (ref 0.27–4.2)

## 2023-04-13 RX ORDER — LEVOTHYROXINE SODIUM 112 UG/1
112 TABLET ORAL DAILY
Qty: 90 TABLET | Refills: 1 | Status: SHIPPED | OUTPATIENT
Start: 2023-04-13

## 2023-08-04 ENCOUNTER — OFFICE VISIT (OUTPATIENT)
Dept: FAMILY MEDICINE CLINIC | Facility: CLINIC | Age: 78
End: 2023-08-04
Payer: MEDICARE

## 2023-08-04 VITALS
BODY MASS INDEX: 41.27 KG/M2 | HEIGHT: 60 IN | WEIGHT: 210.2 LBS | OXYGEN SATURATION: 97 % | TEMPERATURE: 98.6 F | HEART RATE: 98 BPM | DIASTOLIC BLOOD PRESSURE: 82 MMHG | SYSTOLIC BLOOD PRESSURE: 134 MMHG

## 2023-08-04 DIAGNOSIS — B49 FUNGAL INFECTION: Primary | ICD-10-CM

## 2023-08-04 PROCEDURE — 99213 OFFICE O/P EST LOW 20 MIN: CPT | Performed by: NURSE PRACTITIONER

## 2023-08-04 PROCEDURE — 3079F DIAST BP 80-89 MM HG: CPT | Performed by: NURSE PRACTITIONER

## 2023-08-04 PROCEDURE — 3075F SYST BP GE 130 - 139MM HG: CPT | Performed by: NURSE PRACTITIONER

## 2023-08-04 RX ORDER — NYSTATIN 100000 [USP'U]/G
POWDER TOPICAL 3 TIMES DAILY
Qty: 60 G | Refills: 1 | Status: SHIPPED | OUTPATIENT
Start: 2023-08-04

## 2023-08-21 ENCOUNTER — OFFICE VISIT (OUTPATIENT)
Dept: FAMILY MEDICINE CLINIC | Facility: CLINIC | Age: 78
End: 2023-08-21
Payer: MEDICARE

## 2023-08-21 VITALS
WEIGHT: 211.8 LBS | HEIGHT: 65 IN | OXYGEN SATURATION: 96 % | BODY MASS INDEX: 35.29 KG/M2 | DIASTOLIC BLOOD PRESSURE: 86 MMHG | TEMPERATURE: 98.3 F | SYSTOLIC BLOOD PRESSURE: 142 MMHG

## 2023-08-21 DIAGNOSIS — R42 VERTIGO: ICD-10-CM

## 2023-08-21 DIAGNOSIS — E03.8 HYPOTHYROIDISM DUE TO HASHIMOTO'S THYROIDITIS: Primary | ICD-10-CM

## 2023-08-21 DIAGNOSIS — Z79.899 ENCOUNTER FOR LONG-TERM (CURRENT) USE OF MEDICATIONS: ICD-10-CM

## 2023-08-21 DIAGNOSIS — I10 BENIGN ESSENTIAL HYPERTENSION: ICD-10-CM

## 2023-08-21 DIAGNOSIS — K21.9 GASTROESOPHAGEAL REFLUX DISEASE WITHOUT ESOPHAGITIS: ICD-10-CM

## 2023-08-21 DIAGNOSIS — E06.3 HYPOTHYROIDISM DUE TO HASHIMOTO'S THYROIDITIS: Primary | ICD-10-CM

## 2023-08-21 PROCEDURE — 3079F DIAST BP 80-89 MM HG: CPT | Performed by: FAMILY MEDICINE

## 2023-08-21 PROCEDURE — 99214 OFFICE O/P EST MOD 30 MIN: CPT | Performed by: FAMILY MEDICINE

## 2023-08-21 PROCEDURE — 3077F SYST BP >= 140 MM HG: CPT | Performed by: FAMILY MEDICINE

## 2023-08-21 NOTE — PROGRESS NOTES
"Chief Complaint  Med Refill, Dizziness, Cough, and Heartburn    Subjective        Mer Ramos presents to [facility]  History of Present Illness  Patient is here to follow up on her chronic medical problems:    Hypertension.   Diagnosed in the year 2000.  She currently takes valsartan 160 mg 1.5 tabs daily.  Blood pressures are much better at that dosage.    Hashimoto's thyroiditis.  Diagnosed in 1977.  She then had a L subtotal thyroidectomy, in 2005 and then the right side was removed in 2018.  She currently takes levothyroxine 112 mcg daily.  Thyroid is stable    GERD.  Seen by GI for colonoscopy in January 2022.  Was started on omeprazole 20 mg daily for reflux.  The patient states that her heartburn is much better.  She is also trying to avoid foods that trigger her heartburn.    Patient is also here for new problem.  She says that she struggles from intermittent vertigo.  And recently her vertigo seems to be occurring more often.  She states that she has had a little bit of nasal congestion, left sided ear pressure and allergy symptoms.  However the patient is not taking a nasal spray or allergy medication.  She denies any chest pain, shortness of breath, or palpitations.  She states that her vertigo is exactly the same as its been in the past just more frequent.  She has gone to physical therapy for vertigo before.    Objective   Vital Signs:  /86   Temp 98.3 øF (36.8 øC)   Ht 165.1 cm (65\")   Wt 96.1 kg (211 lb 12.8 oz)   SpO2 96%   BMI 35.25 kg/mý   Estimated body mass index is 35.25 kg/mý as calculated from the following:    Height as of this encounter: 165.1 cm (65\").    Weight as of this encounter: 96.1 kg (211 lb 12.8 oz).             Physical Exam  Vitals and nursing note reviewed.   Constitutional:       Appearance: Normal appearance. She is well-developed.   HENT:      Head: Normocephalic and atraumatic.      Right Ear: External ear normal.      Left Ear: " External ear normal.      Nose: Nose normal.   Eyes:      General: No scleral icterus.     Conjunctiva/sclera: Conjunctivae normal.   Cardiovascular:      Rate and Rhythm: Normal rate and regular rhythm.      Heart sounds: Normal heart sounds.   Pulmonary:      Effort: Pulmonary effort is normal.      Breath sounds: Normal breath sounds.   Musculoskeletal:      Cervical back: Normal range of motion and neck supple.      Right lower leg: No edema.      Left lower leg: No edema.   Lymphadenopathy:      Cervical: No cervical adenopathy.   Skin:     General: Skin is warm and dry.      Findings: No rash.   Neurological:      Mental Status: She is alert and oriented to person, place, and time.   Psychiatric:         Mood and Affect: Mood normal.         Behavior: Behavior normal.         Thought Content: Thought content normal.         Judgment: Judgment normal.      Result Review :  The following data was reviewed by: Jeannette Merritt DO on 08/21/2023:  Common labs          1/5/2023    10:40 4/12/2023    10:59   Common Labs   Glucose 92  96    BUN 12  7    Creatinine 0.85  0.88    Sodium 138  138    Potassium 4.0  4.6    Chloride 103  101    Calcium 9.1  9.6    Total Protein 7.4  7.1    Albumin 4.4  4.7    Total Bilirubin 0.5  0.4    Alkaline Phosphatase 37  38    AST (SGOT) 16  16    ALT (SGPT) 10  11    WBC 5.46     Hemoglobin 12.2     Hematocrit 36.8     Platelets 157     Total Cholesterol 212     Triglycerides 180     HDL Cholesterol 35     LDL Cholesterol  144       TSH          1/5/2023    10:40 4/12/2023    10:59   TSH   TSH 0.721  1.350                   Assessment and Plan   Diagnoses and all orders for this visit:    1. 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 Benign essential hypertension  -     Comprehensive Metabolic Panel    3. Gastroesophageal reflux disease without esophagitis    4. Encounter for long-term (current) use of medications  -     TSH  -     Comprehensive  Metabolic Panel  -     CBC & Differential    5. Vertigo    Patient is here today for chronic stable hypothyroidism, hypertension, and GERD.  Surveillance labs were obtained today and any medication changes will be made based on lab results and will be called to the patient later this week.     Patient is also here for worsening symptoms of vertigo.  I advised the patient to restart Zyrtec 10 mg daily and Flonase.  We will check labs to make sure there is no metabolic cause of her dizziness.  Patient was also advised that she may use meclizine or hydroxyzine as needed for vertigo or anxiety.  If her symptoms worsen or persist that she was advised to follow-up.         Follow Up   No follow-ups on file.  Patient was given instructions and counseling regarding her condition or for health maintenance advice. Please see specific information pulled into the AVS if appropriate.

## 2023-08-22 DIAGNOSIS — E06.3 HYPOTHYROIDISM DUE TO HASHIMOTO'S THYROIDITIS: ICD-10-CM

## 2023-08-22 DIAGNOSIS — E03.8 HYPOTHYROIDISM DUE TO HASHIMOTO'S THYROIDITIS: ICD-10-CM

## 2023-08-22 LAB
ALBUMIN SERPL-MCNC: 4.4 G/DL (ref 3.8–4.8)
ALBUMIN/GLOB SERPL: 1.5 {RATIO} (ref 1.2–2.2)
ALP SERPL-CCNC: 36 IU/L (ref 44–121)
ALT SERPL-CCNC: 11 IU/L (ref 0–32)
AST SERPL-CCNC: 18 IU/L (ref 0–40)
BASOPHILS # BLD AUTO: 0.1 X10E3/UL (ref 0–0.2)
BASOPHILS NFR BLD AUTO: 1 %
BILIRUB SERPL-MCNC: 0.3 MG/DL (ref 0–1.2)
BUN SERPL-MCNC: 11 MG/DL (ref 8–27)
BUN/CREAT SERPL: 11 (ref 12–28)
CALCIUM SERPL-MCNC: 9.1 MG/DL (ref 8.7–10.3)
CHLORIDE SERPL-SCNC: 102 MMOL/L (ref 96–106)
CO2 SERPL-SCNC: 25 MMOL/L (ref 20–29)
CREAT SERPL-MCNC: 1.01 MG/DL (ref 0.57–1)
EGFRCR SERPLBLD CKD-EPI 2021: 57 ML/MIN/1.73
EOSINOPHIL # BLD AUTO: 0.2 X10E3/UL (ref 0–0.4)
EOSINOPHIL NFR BLD AUTO: 4 %
ERYTHROCYTE [DISTWIDTH] IN BLOOD BY AUTOMATED COUNT: 14.2 % (ref 11.7–15.4)
GLOBULIN SER CALC-MCNC: 2.9 G/DL (ref 1.5–4.5)
GLUCOSE SERPL-MCNC: 96 MG/DL (ref 70–99)
HCT VFR BLD AUTO: 36 % (ref 34–46.6)
HGB BLD-MCNC: 11.7 G/DL (ref 11.1–15.9)
IMM GRANULOCYTES # BLD AUTO: 0 X10E3/UL (ref 0–0.1)
IMM GRANULOCYTES NFR BLD AUTO: 0 %
LYMPHOCYTES # BLD AUTO: 1.8 X10E3/UL (ref 0.7–3.1)
LYMPHOCYTES NFR BLD AUTO: 32 %
MCH RBC QN AUTO: 27.8 PG (ref 26.6–33)
MCHC RBC AUTO-ENTMCNC: 32.5 G/DL (ref 31.5–35.7)
MCV RBC AUTO: 86 FL (ref 79–97)
MONOCYTES # BLD AUTO: 0.4 X10E3/UL (ref 0.1–0.9)
MONOCYTES NFR BLD AUTO: 7 %
NEUTROPHILS # BLD AUTO: 3.2 X10E3/UL (ref 1.4–7)
NEUTROPHILS NFR BLD AUTO: 56 %
PLATELET # BLD AUTO: 154 X10E3/UL (ref 150–450)
POTASSIUM SERPL-SCNC: 4.2 MMOL/L (ref 3.5–5.2)
PROT SERPL-MCNC: 7.3 G/DL (ref 6–8.5)
RBC # BLD AUTO: 4.21 X10E6/UL (ref 3.77–5.28)
SODIUM SERPL-SCNC: 140 MMOL/L (ref 134–144)
TSH SERPL DL<=0.005 MIU/L-ACNC: 2.09 UIU/ML (ref 0.45–4.5)
WBC # BLD AUTO: 5.7 X10E3/UL (ref 3.4–10.8)

## 2023-08-22 RX ORDER — LEVOTHYROXINE SODIUM 112 UG/1
112 TABLET ORAL DAILY
Qty: 90 TABLET | Refills: 1 | Status: SHIPPED | OUTPATIENT
Start: 2023-08-22

## 2023-08-29 ENCOUNTER — OFFICE VISIT (OUTPATIENT)
Dept: FAMILY MEDICINE CLINIC | Facility: CLINIC | Age: 78
End: 2023-08-29
Payer: MEDICARE

## 2023-08-29 VITALS
OXYGEN SATURATION: 100 % | HEIGHT: 65 IN | HEART RATE: 75 BPM | DIASTOLIC BLOOD PRESSURE: 74 MMHG | SYSTOLIC BLOOD PRESSURE: 144 MMHG | BODY MASS INDEX: 35.65 KG/M2 | WEIGHT: 214 LBS

## 2023-08-29 DIAGNOSIS — Z78.0 ENCOUNTER FOR OSTEOPOROSIS SCREENING IN ASYMPTOMATIC POSTMENOPAUSAL PATIENT: ICD-10-CM

## 2023-08-29 DIAGNOSIS — Z00.00 MEDICARE ANNUAL WELLNESS VISIT, SUBSEQUENT: Primary | ICD-10-CM

## 2023-08-29 DIAGNOSIS — Z13.820 ENCOUNTER FOR OSTEOPOROSIS SCREENING IN ASYMPTOMATIC POSTMENOPAUSAL PATIENT: ICD-10-CM

## 2023-08-29 NOTE — PROGRESS NOTES
The ABCs of the Annual Wellness Visit  Subsequent Medicare Wellness Visit    Subjective    Mer Ramos is a 78 y.o. female who presents for a Subsequent Medicare Wellness Visit.    The following portions of the patient's history were reviewed and   updated as appropriate: allergies, current medications, past family history, past medical history, past social history, past surgical history, and problem list.    Compared to one year ago, the patient feels her physical   health is the same.    Compared to one year ago, the patient feels her mental   health is the same.    Recent Hospitalizations:  She was not admitted to the hospital during the last year.       Current Medical Providers:  Patient Care Team:  Jeannette Merritt DO as PCP - General (Family Medicine)  Poli Mcfarland MD as Surgeon (General Surgery)    Outpatient Medications Prior to Visit   Medication Sig Dispense Refill    acetaminophen (TYLENOL) 500 MG tablet Take 2 tablets by mouth Every 6 (Six) Hours As Needed for Mild Pain.      levothyroxine (SYNTHROID, LEVOTHROID) 112 MCG tablet Take 1 tablet by mouth Daily. 90 tablet 1    meclizine (ANTIVERT) 25 MG tablet Take 1 tablet by mouth At Night As Needed for Dizziness (or sleep). 90 tablet 0    omeprazole (priLOSEC) 20 MG capsule       valsartan (DIOVAN) 160 MG tablet Take 1.5 tablets by mouth Daily. 135 tablet 1    nystatin (MYCOSTATIN) 809971 UNIT/GM powder Apply  topically to the appropriate area as directed 3 (Three) Times a Day. (Patient not taking: Reported on 8/21/2023) 60 g 1     No facility-administered medications prior to visit.       No opioid medication identified on active medication list. I have reviewed chart for other potential  high risk medication/s and harmful drug interactions in the elderly.        Aspirin is not on active medication list.  Aspirin use is not indicated based on review of current medical condition/s. Risk of harm outweighs potential benefits.  .    Patient Active  "Problem List   Diagnosis    Benign essential hypertension    Chest pain    Hyperlipidemia    Statin intolerance    Calculus of gallbladder without cholecystitis without obstruction    Reflux esophagitis    Vertigo    Abnormal gait    Acute frontal sinusitis    Acute upper respiratory infection    Allergic reaction    Allergic rhinitis    Anxiety    Osteoarthritis    At risk for falls    Compression injury of nerve    Neuropathy    Edema of both lower extremities    Fatigue    Gastroesophageal reflux disease    History of cholecystectomy    History of thyroidectomy    Hypokalemia    Impairment of balance    Influenza-like symptoms    Laceration of lower limb    Low back pain    Lumbosacral radiculopathy    Multiple gastric ulcers    Nutritional anemia, unspecified    Pain in joint    Polyp of colon    Rash    Shortness of breath    Urinary tract infection    Essential hypertension    Abdominal pain    Depressive disorder    Hypothyroidism    Encounter for screening for malignant neoplasm of colon    Personal history of colonic polyps     Advance Care Planning   Advance Care Planning     Advance Directive is not on file.  ACP discussion was held with the patient during this visit. Patient does not have an advance directive, information provided.     Objective    Vitals:    08/29/23 0826   BP: 144/74   Pulse: 75   SpO2: 100%   Weight: 97.1 kg (214 lb)   Height: 165.1 cm (65\")     Estimated body mass index is 35.61 kg/mý as calculated from the following:    Height as of this encounter: 165.1 cm (65\").    Weight as of this encounter: 97.1 kg (214 lb).    Class 2 Severe Obesity (BMI >=35 and <=39.9). Obesity-related health conditions include the following: hypertension and GERD. Obesity is unchanged. BMI is is above average; BMI management plan is completed. We discussed portion control and increasing exercise.      Does the patient have evidence of cognitive impairment? No          HEALTH RISK ASSESSMENT    Smoking " Status:  Social History     Tobacco Use   Smoking Status Never   Smokeless Tobacco Never     Alcohol Consumption:  Social History     Substance and Sexual Activity   Alcohol Use Yes    Comment: rare     Fall Risk Screen:    SREE Fall Risk Assessment was completed, and patient is at MODERATE risk for falls. Assessment completed on:2023    Depression Screenin/29/2023     8:25 AM   PHQ-2/PHQ-9 Depression Screening   Little Interest or Pleasure in Doing Things 0-->not at all   Feeling Down, Depressed or Hopeless 0-->not at all   PHQ-9: Brief Depression Severity Measure Score 0       Health Habits and Functional and Cognitive Screenin/29/2023     8:23 AM   Functional & Cognitive Status   Do you have difficulty preparing food and eating? No   Do you have difficulty bathing yourself, getting dressed or grooming yourself? No   Do you have difficulty using the toilet? No   Do you have difficulty moving around from place to place? No   Do you have trouble with steps or getting out of a bed or a chair? No   Current Diet Well Balanced Diet   Dental Exam Up to date   Eye Exam Up to date   Exercise (times per week) 2 times per week   Current Exercises Include Aerobics;Walking;House Cleaning   Do you need help using the phone?  No   Are you deaf or do you have serious difficulty hearing?  No   Do you need help to go to places out of walking distance? No   Do you need help shopping? No   Do you need help preparing meals?  No   Do you need help with housework?  No   Do you need help with laundry? No   Do you need help taking your medications? No   Do you need help managing money? No   Do you ever drive or ride in a car without wearing a seat belt? No       Age-appropriate Screening Schedule:  Refer to the list below for future screening recommendations based on patient's age, sex and/or medical conditions. Orders for these recommended tests are listed in the plan section. The patient has been provided with a  written plan.    Health Maintenance   Topic Date Due    TDAP/TD VACCINES (1 - Tdap) Never done    HEPATITIS C SCREENING  Never done    DXA SCAN  07/12/2020    COVID-19 Vaccine (6 - Moderna series) 09/17/2022    ZOSTER VACCINE (2 of 2) 07/13/2023    INFLUENZA VACCINE  10/01/2023    LIPID PANEL  01/05/2024    ANNUAL WELLNESS VISIT  08/29/2024    BMI FOLLOWUP  08/29/2024    COLORECTAL CANCER SCREENING  01/28/2032    Pneumococcal Vaccine 65+  Discontinued                  CMS Preventative Services Quick Reference  Risk Factors Identified During Encounter  Fall Risk-High or Moderate: Discussed Fall Prevention in the home  Immunizations Discussed/Encouraged: Influenza, Prevnar 20 (Pneumococcal 20-valent conjugate), Shingrix, and COVID19  Dental Screening Recommended  Vision Screening Recommended  The above risks/problems have been discussed with the patient.  Pertinent information has been shared with the patient in the After Visit Summary.  An After Visit Summary and PPPS were made available to the patient.    Follow Up:   Next Medicare Wellness visit to be scheduled in 1 year.                  Assessment and Plan   Diagnoses and all orders for this visit:    1. Medicare annual wellness visit, subsequent (Primary)    2. Encounter for osteoporosis screening in asymptomatic postmenopausal patient  -     DEXA Bone Density Axial; Future      RHM.  Patient prefers not to have a mammogram this year.  DEXA scan ordered.  Since patient is not having any blood drawn today we will get a hepatitis C screening test at her next follow-up appointment.  Patient plans to get COVID and flu vaccines next month.  Patient has had one of the Shingrix's vaccines, she will be due for the other in September.         Follow Up   Return in about 6 months (around 2/29/2024) for Thyroid.  Patient was given instructions and counseling regarding her condition or for health maintenance advice. Please see specific information pulled into the AVS if  appropriate.

## 2023-09-28 DIAGNOSIS — I10 BENIGN ESSENTIAL HYPERTENSION: ICD-10-CM

## 2023-09-28 RX ORDER — VALSARTAN 160 MG/1
TABLET ORAL
Qty: 135 TABLET | Refills: 1 | Status: SHIPPED | OUTPATIENT
Start: 2023-09-28

## (undated) DEVICE — VIAL FORMALIN CAP 10P 40ML

## (undated) DEVICE — TROCAR: Brand: KII® SLEEVE

## (undated) DEVICE — GLV SURG EUDERMIC PF LTX 8 STRL

## (undated) DEVICE — JACKT LAB F/R KNIT CUFF/COLR XLG BLU

## (undated) DEVICE — 2, DISPOSABLE SUCTION/IRRIGATOR WITH DISPOSABLE TIP: Brand: STRYKEFLOW

## (undated) DEVICE — SUT MNCRYL 4/0 PS2 18 IN

## (undated) DEVICE — BNDG ADHS CURAD FLX/FABRC 2X4IN STRL LF

## (undated) DEVICE — ENDOPATH XCEL BLUNT TIP TROCARS WITH SMOOTH SLEEVES: Brand: ENDOPATH XCEL

## (undated) DEVICE — SUT ETHIB 0/0 MO6 I8IN CX45D

## (undated) DEVICE — BNDG ADHS PLSTC 1X3IN LF

## (undated) DEVICE — KT ORCA ORCAPOD DISP STRL

## (undated) DEVICE — SUCTION CANISTER, 1000CC,SAFELINER: Brand: DEROYAL

## (undated) DEVICE — BW-412T DISP COMBO CLEANING BRUSH: Brand: SINGLE USE COMBINATION CLEANING BRUSH

## (undated) DEVICE — Device

## (undated) DEVICE — APPL CHLORAPREP W/TINT 26ML ORNG

## (undated) DEVICE — TBG INSUFL W FLTR STRL

## (undated) DEVICE — FRCP BX RADJAW4 NDL 2.8 240CM LG OG BX40

## (undated) DEVICE — SAFELINER SUCTION CANISTER 1000CC: Brand: DEROYAL

## (undated) DEVICE — SPNG GZ WOVN 4X4IN 12PLY 10/BX STRL

## (undated) DEVICE — GLV SURG SENSICARE PI MIC PF SZ7.5 LF STRL

## (undated) DEVICE — PK LAP GEN 90

## (undated) DEVICE — LAPAROSCOPIC TROCAR SLEEVE/SINGLE USE: Brand: KII® OPTICAL ACCESS SYSTEM

## (undated) DEVICE — ADAPT CLN BIOGUARD AIR/H2O DISP

## (undated) DEVICE — METZENBAUM SCISSOR TIP, DISPOSABLE: Brand: RENEW